# Patient Record
Sex: FEMALE | Race: WHITE | NOT HISPANIC OR LATINO | Employment: FULL TIME | URBAN - NONMETROPOLITAN AREA
[De-identification: names, ages, dates, MRNs, and addresses within clinical notes are randomized per-mention and may not be internally consistent; named-entity substitution may affect disease eponyms.]

---

## 2017-02-02 ENCOUNTER — APPOINTMENT (OUTPATIENT)
Dept: LAB | Facility: HOSPITAL | Age: 22
End: 2017-02-02

## 2017-02-02 ENCOUNTER — OFFICE VISIT (OUTPATIENT)
Dept: OBSTETRICS AND GYNECOLOGY | Facility: CLINIC | Age: 22
End: 2017-02-02

## 2017-02-02 VITALS
HEIGHT: 61 IN | DIASTOLIC BLOOD PRESSURE: 70 MMHG | SYSTOLIC BLOOD PRESSURE: 102 MMHG | BODY MASS INDEX: 32.66 KG/M2 | WEIGHT: 173 LBS

## 2017-02-02 DIAGNOSIS — Z11.3 SCREENING FOR STDS (SEXUALLY TRANSMITTED DISEASES): ICD-10-CM

## 2017-02-02 DIAGNOSIS — Z01.419 WELL WOMAN EXAM WITH ROUTINE GYNECOLOGICAL EXAM: Primary | ICD-10-CM

## 2017-02-02 LAB
CANDIDA ALBICANS: NEGATIVE
GARDNERELLA VAGINALIS: NEGATIVE
TRICHOMONAS VAGINALIS PCR: NEGATIVE

## 2017-02-02 PROCEDURE — 86592 SYPHILIS TEST NON-TREP QUAL: CPT | Performed by: NURSE PRACTITIONER

## 2017-02-02 PROCEDURE — G0432 EIA HIV-1/HIV-2 SCREEN: HCPCS | Performed by: NURSE PRACTITIONER

## 2017-02-02 PROCEDURE — 86803 HEPATITIS C AB TEST: CPT | Performed by: NURSE PRACTITIONER

## 2017-02-02 PROCEDURE — 87800 DETECT AGNT MULT DNA DIREC: CPT | Performed by: NURSE PRACTITIONER

## 2017-02-02 PROCEDURE — 36415 COLL VENOUS BLD VENIPUNCTURE: CPT | Performed by: NURSE PRACTITIONER

## 2017-02-02 PROCEDURE — 86695 HERPES SIMPLEX TYPE 1 TEST: CPT | Performed by: NURSE PRACTITIONER

## 2017-02-02 PROCEDURE — 87661 TRICHOMONAS VAGINALIS AMPLIF: CPT | Performed by: NURSE PRACTITIONER

## 2017-02-02 PROCEDURE — 87340 HEPATITIS B SURFACE AG IA: CPT | Performed by: NURSE PRACTITIONER

## 2017-02-02 PROCEDURE — 99395 PREV VISIT EST AGE 18-39: CPT | Performed by: NURSE PRACTITIONER

## 2017-02-02 PROCEDURE — 86696 HERPES SIMPLEX TYPE 2 TEST: CPT | Performed by: NURSE PRACTITIONER

## 2017-02-02 PROCEDURE — 87512 GARDNER VAG DNA QUANT: CPT | Performed by: NURSE PRACTITIONER

## 2017-02-02 PROCEDURE — 88142 CYTOPATH C/V THIN LAYER: CPT | Performed by: NURSE PRACTITIONER

## 2017-02-02 PROCEDURE — 87481 CANDIDA DNA AMP PROBE: CPT | Performed by: NURSE PRACTITIONER

## 2017-02-02 NOTE — PROGRESS NOTES
"Subjective   History of Present Illness    Avis Ziegler is a 21 y.o. female who presents for annual exam and STI testing. Current partner has been unfaithful. She has been experiencing vaginal discharge with odor for a few days. Denies genital lesions, vaginal irritation, or pruritus.     Current contraception: Mirena IUD  Sexually active?: Yes  Postmenopausal?: No  HRT?: no  Hysterectomy?: no    Date last pap: none on file  History of abnormal Pap smear: no    Visit Vitals   • /70   • Ht 61\" (154.9 cm)   • Wt 173 lb (78.5 kg)   • LMP 2017 (Exact Date)   • Breastfeeding No   • BMI 32.69 kg/m2       Past Medical History   Diagnosis Date   • Acute sinusitis    • Conjunctivitis       BILATERAL     • Encounter for insertion of intrauterine contraceptive device    • Encounter for  visit       visit status    • Encounter for surveillance of other contraceptives    • IUD check up    • Pain in pelvis    • Primigravida    • Upper respiratory infection    • Urinary tract infectious disease    • Vaginal discharge      Fishy vaginal discharge         Past Surgical History   Procedure Laterality Date   •  section  2014     Primary low transverse  section. Intrauterine pregnancy at 40 5/7 weeks gestation. Induction of labor secondary to postdates. Patient requests  section   • Injection of medication  2016     Marta (1)  Elizabeth Palacios       The following portions of the patient's history were reviewed and updated as appropriate: allergies, current medications, past family history, past medical history, past social history, past surgical history and problem list.    Review of Systems    Constitutional:  No fatigue, no weight loss, no weight gain, no fever, no chills   Respiratory: No dyspnea, no cough, no hemoptysis, no wheezing, no pleuritic pain   Cardiovascular: No chest pain, no palpitations, no arrhythmia, no orthopnea, no nocturnal dyspnea, no " edema, no claudication   Breasts: No discharge from nipple, No breast tenderness and No breast mass   Gastrointestinal: No loss of appetite, No dysphagia, No abdominal pain, No nausea, No vomiting, No change in bowel habits, No diarrhea, No constipation and No blood in stool   Genitourinary: No increased frequency of urination, No dysuria, No hematuria, No nocturia, No urinary incontinence, No abnormal vaginal bleeding, No pelvic pain, No menstrual problem, No menopausal problem and Vaginal discharge   Skin: No skin rash, No skin lesion, No dry skin, No pruritus and No nail problem   Neurologic: No headache, No dizziness, No lightheadedness, No syncope, No vertigo, No weakness, No numbness, No tremor and No paresthesia   Psychiatric: No difficulty sleeping, No mood swings, No feeling anxious, No confusion and No memory loss          Objective   Physical Exam    General:  Alert and oriented x 3, Cooperative, Well developed & well nourished and No acute distress   Abdomen: Soft, nondistended, non-tender, without masses or organomegaly   Breast: Inspection negative, no nipple discharge or bleeding, no masses or nodularity palpable and Symmetrical breasts in shape, size, consistency   Genitourinary: Vulva normal, vaginal mucosa normal, bladder nondistended and nontender, cervix normal, uterus normal size and nontender, no adnexal/pelvic tenderness or masses, Bartholin's/[place]/Urethral gland WNL, Vaginal discharge   Skin: Skin warm and dry and Pattern of hair growth normal       Assessment/Plan   Avis was seen today for personal problem.    Diagnoses and all orders for this visit:    Well woman exam with routine gynecological exam  -     Liquid-based Pap Smear, Screening    Screening for STDs (sexually transmitted diseases)  -     C Trachomatis / N Gonorrhoeae PCR  -     Hepatitis B Surface Antigen  -     Hepatitis C Antibody  -     HIV-1 & HIV-2 Antibodies  -     HSV 1 & 2 - Specific Antibody, IgG  -     RPR  -      Vaginitis / Vaginosis DNA Probe      All questions answered.  Labs today.  Will rule out vaginal infections.  Discussed sexual activity and couseled on sexual health. Recommended condoms for prevention of sexually transmitted diseases.  Follow up in 1 year.

## 2017-02-03 LAB
C TRACH RRNA CVX QL NAA+PROBE: NOT DETECTED
HBV SURFACE AG SERPL QL IA: NEGATIVE
HCV AB SER DONR QL: NEGATIVE
HCV S/C RATIO: 0.06 (ref 0–0.89)
HIV1+2 AB SER QL: NEGATIVE
HSV1 IGG SER IA-ACNC: 45.4 INDEX (ref 0–0.9)
HSV2 IGG SER IA-ACNC: <0.91 INDEX (ref 0–0.9)
N GONORRHOEA RRNA SPEC QL NAA+PROBE: NOT DETECTED
RPR SER QL: NORMAL

## 2017-02-06 RX ORDER — VALACYCLOVIR HYDROCHLORIDE 500 MG/1
500 TABLET, FILM COATED ORAL DAILY
Qty: 30 TABLET | Refills: 11 | Status: SHIPPED | OUTPATIENT
Start: 2017-02-06 | End: 2019-03-08

## 2017-02-08 LAB
LAB AP CASE REPORT: NORMAL
LAB AP GYN ADDITIONAL INFORMATION: NORMAL
LAB AP GYN OTHER FINDINGS: NORMAL
Lab: NORMAL
PATH INTERP SPEC-IMP: NORMAL
STAT OF ADQ CVX/VAG CYTO-IMP: NORMAL

## 2018-09-05 ENCOUNTER — HOSPITAL ENCOUNTER (OUTPATIENT)
Dept: ULTRASOUND IMAGING | Facility: HOSPITAL | Age: 23
Discharge: HOME | End: 2018-09-05
Admitting: NURSE PRACTITIONER

## 2018-09-05 DIAGNOSIS — R10.32 LEFT LOWER QUADRANT PAIN: ICD-10-CM

## 2018-09-05 PROCEDURE — 76830 TRANSVAGINAL US NON-OB: CPT

## 2019-03-08 PROCEDURE — 87660 TRICHOMONAS VAGIN DIR PROBE: CPT | Performed by: NURSE PRACTITIONER

## 2019-03-08 PROCEDURE — 87510 GARDNER VAG DNA DIR PROBE: CPT | Performed by: NURSE PRACTITIONER

## 2019-03-08 PROCEDURE — 87480 CANDIDA DNA DIR PROBE: CPT | Performed by: NURSE PRACTITIONER

## 2019-03-11 ENCOUNTER — PROCEDURE VISIT (OUTPATIENT)
Dept: OBSTETRICS AND GYNECOLOGY | Facility: CLINIC | Age: 24
End: 2019-03-11

## 2019-03-11 ENCOUNTER — APPOINTMENT (OUTPATIENT)
Dept: LAB | Facility: HOSPITAL | Age: 24
End: 2019-03-11

## 2019-03-11 VITALS
WEIGHT: 154 LBS | DIASTOLIC BLOOD PRESSURE: 90 MMHG | BODY MASS INDEX: 29.07 KG/M2 | HEIGHT: 61 IN | SYSTOLIC BLOOD PRESSURE: 134 MMHG

## 2019-03-11 DIAGNOSIS — Z12.4 ENCOUNTER FOR PAPANICOLAOU SMEAR OF CERVIX: Primary | ICD-10-CM

## 2019-03-11 DIAGNOSIS — N76.0 BV (BACTERIAL VAGINOSIS): ICD-10-CM

## 2019-03-11 DIAGNOSIS — B96.89 BV (BACTERIAL VAGINOSIS): ICD-10-CM

## 2019-03-11 DIAGNOSIS — Z01.419 ENCOUNTER FOR WELL WOMAN EXAM WITH ROUTINE GYNECOLOGICAL EXAM: ICD-10-CM

## 2019-03-11 DIAGNOSIS — N86 ECTROPION, CERVIX: ICD-10-CM

## 2019-03-11 DIAGNOSIS — Z30.431 ENCOUNTER FOR ROUTINE CHECKING OF INTRAUTERINE CONTRACEPTIVE DEVICE (IUD): ICD-10-CM

## 2019-03-11 LAB
25(OH)D3 SERPL-MCNC: 39.9 NG/ML (ref 30–100)
ALBUMIN SERPL-MCNC: 4.4 G/DL (ref 3.4–4.8)
ALBUMIN/GLOB SERPL: 1.5 G/DL (ref 1.1–1.8)
ALP SERPL-CCNC: 59 U/L (ref 38–126)
ALT SERPL W P-5'-P-CCNC: 26 U/L (ref 9–52)
ANION GAP SERPL CALCULATED.3IONS-SCNC: 10 MMOL/L (ref 5–15)
AST SERPL-CCNC: 24 U/L (ref 14–36)
BASOPHILS # BLD AUTO: 0.03 10*3/MM3 (ref 0–0.2)
BASOPHILS NFR BLD AUTO: 0.3 % (ref 0–1.5)
BILIRUB SERPL-MCNC: 0.3 MG/DL (ref 0.2–1.3)
BUN BLD-MCNC: 10 MG/DL (ref 7–21)
BUN/CREAT SERPL: 15.9 (ref 7–25)
CALCIUM SPEC-SCNC: 9.3 MG/DL (ref 8.4–10.2)
CHLORIDE SERPL-SCNC: 103 MMOL/L (ref 95–110)
CO2 SERPL-SCNC: 25 MMOL/L (ref 22–31)
CREAT BLD-MCNC: 0.63 MG/DL (ref 0.5–1)
DEPRECATED RDW RBC AUTO: 35.5 FL (ref 37–54)
EOSINOPHIL # BLD AUTO: 0.06 10*3/MM3 (ref 0–0.4)
EOSINOPHIL NFR BLD AUTO: 0.7 % (ref 0.3–6.2)
ERYTHROCYTE [DISTWIDTH] IN BLOOD BY AUTOMATED COUNT: 11.6 % (ref 12.3–15.4)
GFR SERPL CREATININE-BSD FRML MDRD: 116 ML/MIN/1.73 (ref 71–165)
GLOBULIN UR ELPH-MCNC: 3 GM/DL (ref 2.3–3.5)
GLUCOSE BLD-MCNC: 83 MG/DL (ref 60–100)
HCT VFR BLD AUTO: 41.6 % (ref 34–46.6)
HGB BLD-MCNC: 14.5 G/DL (ref 12–15.9)
IMM GRANULOCYTES # BLD AUTO: 0.03 10*3/MM3 (ref 0–0.05)
IMM GRANULOCYTES NFR BLD AUTO: 0.3 % (ref 0–0.5)
LYMPHOCYTES # BLD AUTO: 2.56 10*3/MM3 (ref 0.7–3.1)
LYMPHOCYTES NFR BLD AUTO: 28.4 % (ref 19.6–45.3)
MCH RBC QN AUTO: 29.8 PG (ref 26.6–33)
MCHC RBC AUTO-ENTMCNC: 34.9 G/DL (ref 31.5–35.7)
MCV RBC AUTO: 85.4 FL (ref 79–97)
MONOCYTES # BLD AUTO: 0.57 10*3/MM3 (ref 0.1–0.9)
MONOCYTES NFR BLD AUTO: 6.3 % (ref 5–12)
NEUTROPHILS # BLD AUTO: 5.75 10*3/MM3 (ref 1.4–7)
NEUTROPHILS NFR BLD AUTO: 64 % (ref 42.7–76)
NRBC BLD AUTO-RTO: 0 /100 WBC (ref 0–0)
PLATELET # BLD AUTO: 214 10*3/MM3 (ref 140–450)
PMV BLD AUTO: 9.5 FL (ref 6–12)
POTASSIUM BLD-SCNC: 3.9 MMOL/L (ref 3.5–5.1)
PROT SERPL-MCNC: 7.4 G/DL (ref 6.3–8.6)
RBC # BLD AUTO: 4.87 10*6/MM3 (ref 3.77–5.28)
SODIUM BLD-SCNC: 138 MMOL/L (ref 137–145)
WBC NRBC COR # BLD: 9 10*3/MM3 (ref 3.4–10.8)

## 2019-03-11 PROCEDURE — 87624 HPV HI-RISK TYP POOLED RSLT: CPT | Performed by: NURSE PRACTITIONER

## 2019-03-11 PROCEDURE — 36415 COLL VENOUS BLD VENIPUNCTURE: CPT | Performed by: NURSE PRACTITIONER

## 2019-03-11 PROCEDURE — 88141 CYTOPATH C/V INTERPRET: CPT | Performed by: PATHOLOGY

## 2019-03-11 PROCEDURE — 84443 ASSAY THYROID STIM HORMONE: CPT | Performed by: NURSE PRACTITIONER

## 2019-03-11 PROCEDURE — 88142 CYTOPATH C/V THIN LAYER: CPT | Performed by: NURSE PRACTITIONER

## 2019-03-11 PROCEDURE — 85025 COMPLETE CBC W/AUTO DIFF WBC: CPT | Performed by: NURSE PRACTITIONER

## 2019-03-11 PROCEDURE — 80053 COMPREHEN METABOLIC PANEL: CPT | Performed by: NURSE PRACTITIONER

## 2019-03-11 PROCEDURE — 99395 PREV VISIT EST AGE 18-39: CPT | Performed by: NURSE PRACTITIONER

## 2019-03-11 PROCEDURE — 82306 VITAMIN D 25 HYDROXY: CPT | Performed by: NURSE PRACTITIONER

## 2019-03-11 NOTE — PROGRESS NOTES
Subjective   Avis Ziegler is a 23 y.o.     Pt presents for annual exam.  She will also be due for IUD removal next month and desires to have replaced with another mirena.  Avis has normal monthly menses still with IUD but not painful, LMP last week.  She was dx with BV yesterday at urgent care and is currently taking meds.  She denies any other complaints         The following portions of the patient's history were reviewed and updated as appropriate: allergies, current medications, past family history, past medical history, past social history, past surgical history and problem list.    Review of Systems   Gastrointestinal: Negative for abdominal pain.   Genitourinary: Positive for vaginal discharge. Negative for dysuria and pelvic pain.   All other systems reviewed and are negative.        Objective   Physical Exam   Constitutional: She is oriented to person, place, and time. She appears well-developed and well-nourished.   HENT:   Head: Normocephalic.   Eyes: EOM are normal. Pupils are equal, round, and reactive to light.   Neck: Normal range of motion.   Cardiovascular: Normal rate, regular rhythm and normal heart sounds.   Pulmonary/Chest: Effort normal and breath sounds normal. Right breast exhibits no mass. Left breast exhibits no mass. Breasts are symmetrical.   Abdominal: Soft.   Genitourinary: No breast tenderness. Pelvic exam was performed with patient supine. Cervix exhibits friability. Vaginal discharge found.       Genitourinary Comments: Friability with nabothian cyst     Musculoskeletal: Normal range of motion.   Neurological: She is alert and oriented to person, place, and time.   Skin: Skin is warm.   Psychiatric: Her behavior is normal. Judgment and thought content normal.   Nursing note and vitals reviewed.        Assessment/Plan   Avis was seen today for gynecologic exam.    Diagnoses and all orders for this visit:    Encounter for Papanicolaou smear of cervix  -     Liquid-based  Pap Smear, Screening    Encounter for well woman exam with routine gynecological exam  -     CBC & Differential  -     Comprehensive Metabolic Panel  -     Vitamin D 25 Hydroxy  -     Lipid Panel  -     TSH Rfx On Abnormal To Free T4    Encounter for routine checking of intrauterine contraceptive device (IUD)    BV (bacterial vaginosis)    Ectropion, cervix    Plan: Annual visit with pap and annual labs today, IUD strings visualized.  Will return for IUD out/in in next 2-3wks.

## 2019-03-12 LAB
GEN CATEG CVX/VAG CYTO-IMP: ABNORMAL
LAB AP CASE REPORT: ABNORMAL
LAB AP GYN ADDITIONAL INFORMATION: ABNORMAL
PATH INTERP SPEC-IMP: ABNORMAL
STAT OF ADQ CVX/VAG CYTO-IMP: ABNORMAL
TSH SERPL-ACNC: 1.09 UIU/ML (ref 0.45–4.5)

## 2019-03-14 LAB — HPV I/H RISK 4 DNA CVX QL PROBE+SIG AMP: NEGATIVE

## 2019-04-08 ENCOUNTER — PROCEDURE VISIT (OUTPATIENT)
Dept: OBSTETRICS AND GYNECOLOGY | Facility: CLINIC | Age: 24
End: 2019-04-08

## 2019-04-08 VITALS
HEIGHT: 61 IN | DIASTOLIC BLOOD PRESSURE: 77 MMHG | SYSTOLIC BLOOD PRESSURE: 125 MMHG | WEIGHT: 146 LBS | BODY MASS INDEX: 27.56 KG/M2

## 2019-04-08 DIAGNOSIS — Z30.430 ENCOUNTER FOR INSERTION OF MIRENA IUD: Primary | ICD-10-CM

## 2019-04-08 DIAGNOSIS — Z30.432 ENCOUNTER FOR IUD REMOVAL: ICD-10-CM

## 2019-04-08 PROCEDURE — 58300 INSERT INTRAUTERINE DEVICE: CPT | Performed by: ADVANCED PRACTICE MIDWIFE

## 2019-04-08 PROCEDURE — 58301 REMOVE INTRAUTERINE DEVICE: CPT | Performed by: ADVANCED PRACTICE MIDWIFE

## 2019-04-08 NOTE — PROGRESS NOTES
IUD Removal    Date of procedure:    4/8/2019  Preamble:    Risks and benefits discussed? yes  All questions answered? yes  Consents given by The patient  Written consent obtained? yes  Reason for removal: Device expiration    Local anesthesia used:  yes - cetaine spray    Procedure documentation:    A speculum was placed in order to view the cervix.  The cervix did need to be grasped.  Cervical dilation did not need to be performed in order to access the string.  The IUD string was easily seen.  The string was grasped and the IUD was removed without difficulty.  The IUD did appear to be adherent to the uterine cavity. It was removed intact.    She tolerated the procedure without any difficulty.     Post procedure instructions: Patient notified to call with heavy bleeding, fever or increasing pain.     :  Problems Addressed this Visit     None      Visit Diagnoses     Encounter for insertion of mirena IUD    -  Primary    Encounter for IUD removal              Follow up needed: As needed      IUD Insertion    No LMP recorded. Patient has had an implant.    Date of procedure:    4/8/2019  Preamble:    Risks and benefits discussed? yes  All questions answered? yes  Consents given by The patient  Written consent obtained? yes    Local anesthesia used:  yes - citacaine spray   NDC number:    Procedure documentation:    After verifying the patient had a low probability of being pregnant and met the criteria for insertion, a speculum was placed and the cervix was cleansed with an antiseptic solution.  The anterior lip of the cervix was grasped and the uterine cavity was gently sounded. There was no difficulty passing the sound through the cervix.  Cervical dilation did not need to be performed prior to placing the IUD.  The uterus was retroverted and sounded to 7 cms.  The Mirena was then prepared per the manufacturers instructions.    The Mirena was advanced to a point 2 cms from the fundus and then the arms were  released from the sheath.  The device was advanced to the fundus and the device was released fully from the sheath.. The string was cut 2 cms in length.  Bleeding from the cervix was scant.    She tolerated the procedure without any difficulty.    NDC 14826-090-41    Post procedure instructions: Call if any fever or excessive bleeding or pain.                                                       Nothing in the vagina for the next week.     :  Problems Addressed this Visit     None      Visit Diagnoses     Encounter for insertion of mirena IUD    -  Primary    Encounter for IUD removal              Follow up needed:  Three weeks for a string check, sooner if she has any problems.      This note was electronically signed.      This document has been electronically signed by SAÚL Parr on April 8, 2019 8:39 AM      This document has been electronically signed by SAÚL Parr on April 8, 2019 8:37 AM        This document has been electronically signed by SAÚL Parr on April 8, 2019 8:34 AM

## 2019-04-30 ENCOUNTER — OFFICE VISIT (OUTPATIENT)
Dept: OBSTETRICS AND GYNECOLOGY | Facility: CLINIC | Age: 24
End: 2019-04-30

## 2019-04-30 VITALS
DIASTOLIC BLOOD PRESSURE: 80 MMHG | SYSTOLIC BLOOD PRESSURE: 120 MMHG | BODY MASS INDEX: 26.62 KG/M2 | HEIGHT: 61 IN | WEIGHT: 141 LBS

## 2019-04-30 DIAGNOSIS — Z30.431 IUD CHECK UP: Primary | ICD-10-CM

## 2019-04-30 PROCEDURE — 99212 OFFICE O/P EST SF 10 MIN: CPT | Performed by: ADVANCED PRACTICE MIDWIFE

## 2019-04-30 NOTE — PROGRESS NOTES
"Chief Complaint   Patient presents with   • string check     Avis Ziegler is a 23 y.o. year old No obstetric history on file. presenting for follow-up of her recently placed Mirena.  Since the time of insertion flow is typically normal.  She has not been sexually active.  Avis has not been sexually active. Her partner  one year ago & she has not been sexually active. She did not disclose this information at last visit because it was the anniversary of hos death & could not discuss it      /80   Ht 153.7 cm (60.5\")   Wt 64 kg (141 lb)   BMI 27.08 kg/m²     Physical Exam:    Clinical staff was present for exam  External genitalia:  normal appearance of the external genitalia including Bartholin's and [place]'s glands.  :  urethral meatus normal;  Vaginal:  normal pink mucosa without prolapse or lesions.  Cervix:  normal appearance and IUD string present  Uterus:  normal size, shape and consistency.  Adnexa:  normal bimanual exam of the adnexa.    Review of Systems   Constitutional: Negative.    HENT: Negative.    Eyes: Negative.    Respiratory: Negative.    Cardiovascular: Negative.    Gastrointestinal: Negative.    Endocrine: Negative.    Genitourinary: Negative.    Musculoskeletal: Negative.    Skin: Negative.    Allergic/Immunologic: Negative.    Neurological: Negative.    Hematological: Negative.    Psychiatric/Behavioral: Negative.           :  Problems Addressed this Visit     None      Visit Diagnoses     IUD check up    -  Primary          Plan   1. Normal IUD check up  2. RTC in 1 year or PRN    No orders of the defined types were placed in this encounter.               This document has been electronically signed by SAÚL Parr on 2019 9:33 AM      "

## 2019-09-23 ENCOUNTER — APPOINTMENT (OUTPATIENT)
Dept: LAB | Facility: HOSPITAL | Age: 24
End: 2019-09-23

## 2019-09-23 ENCOUNTER — OFFICE VISIT (OUTPATIENT)
Dept: OBSTETRICS AND GYNECOLOGY | Facility: CLINIC | Age: 24
End: 2019-09-23

## 2019-09-23 VITALS
SYSTOLIC BLOOD PRESSURE: 122 MMHG | DIASTOLIC BLOOD PRESSURE: 77 MMHG | WEIGHT: 146 LBS | BODY MASS INDEX: 28.66 KG/M2 | HEIGHT: 60 IN

## 2019-09-23 DIAGNOSIS — Z11.3 SCREENING EXAMINATION FOR STD (SEXUALLY TRANSMITTED DISEASE): Primary | ICD-10-CM

## 2019-09-23 DIAGNOSIS — T83.32XA INTRAUTERINE CONTRACEPTIVE DEVICE THREADS LOST, INITIAL ENCOUNTER: ICD-10-CM

## 2019-09-23 LAB
B-HCG UR QL: NEGATIVE
BILIRUB UR QL STRIP: NEGATIVE
CANDIDA ALBICANS: NEGATIVE
CLARITY UR: CLEAR
COLOR UR: YELLOW
GARDNERELLA VAGINALIS: NEGATIVE
GLUCOSE UR STRIP-MCNC: NEGATIVE MG/DL
HBV SURFACE AG SERPL QL IA: NORMAL
HCV AB SER DONR QL: NORMAL
HGB UR QL STRIP.AUTO: NEGATIVE
HIV1+2 AB SER QL: NORMAL
INTERNAL NEGATIVE CONTROL: NEGATIVE
INTERNAL POSITIVE CONTROL: POSITIVE
KETONES UR QL STRIP: NEGATIVE
LEUKOCYTE ESTERASE UR QL STRIP.AUTO: NEGATIVE
Lab: NORMAL
NITRITE UR QL STRIP: NEGATIVE
PH UR STRIP.AUTO: 6 [PH] (ref 5–8)
PROT UR QL STRIP: NEGATIVE
RPR SER QL: NORMAL
SP GR UR STRIP: 1.01 (ref 1–1.03)
T VAGINALIS DNA VAG QL PROBE+SIG AMP: NEGATIVE
UROBILINOGEN UR QL STRIP: NORMAL

## 2019-09-23 PROCEDURE — 99213 OFFICE O/P EST LOW 20 MIN: CPT | Performed by: FAMILY MEDICINE

## 2019-09-23 PROCEDURE — 86592 SYPHILIS TEST NON-TREP QUAL: CPT | Performed by: FAMILY MEDICINE

## 2019-09-23 PROCEDURE — 36415 COLL VENOUS BLD VENIPUNCTURE: CPT | Performed by: FAMILY MEDICINE

## 2019-09-23 PROCEDURE — 87591 N.GONORRHOEAE DNA AMP PROB: CPT | Performed by: FAMILY MEDICINE

## 2019-09-23 PROCEDURE — 87340 HEPATITIS B SURFACE AG IA: CPT | Performed by: FAMILY MEDICINE

## 2019-09-23 PROCEDURE — 86696 HERPES SIMPLEX TYPE 2 TEST: CPT | Performed by: FAMILY MEDICINE

## 2019-09-23 PROCEDURE — 87510 GARDNER VAG DNA DIR PROBE: CPT | Performed by: FAMILY MEDICINE

## 2019-09-23 PROCEDURE — 81003 URINALYSIS AUTO W/O SCOPE: CPT | Performed by: FAMILY MEDICINE

## 2019-09-23 PROCEDURE — G0432 EIA HIV-1/HIV-2 SCREEN: HCPCS | Performed by: FAMILY MEDICINE

## 2019-09-23 PROCEDURE — 86695 HERPES SIMPLEX TYPE 1 TEST: CPT | Performed by: FAMILY MEDICINE

## 2019-09-23 PROCEDURE — 86694 HERPES SIMPLEX NES ANTBDY: CPT | Performed by: FAMILY MEDICINE

## 2019-09-23 PROCEDURE — 87480 CANDIDA DNA DIR PROBE: CPT | Performed by: FAMILY MEDICINE

## 2019-09-23 PROCEDURE — 87661 TRICHOMONAS VAGINALIS AMPLIF: CPT | Performed by: FAMILY MEDICINE

## 2019-09-23 PROCEDURE — 87660 TRICHOMONAS VAGIN DIR PROBE: CPT | Performed by: FAMILY MEDICINE

## 2019-09-23 PROCEDURE — 81025 URINE PREGNANCY TEST: CPT | Performed by: FAMILY MEDICINE

## 2019-09-23 PROCEDURE — 87491 CHLMYD TRACH DNA AMP PROBE: CPT | Performed by: FAMILY MEDICINE

## 2019-09-23 PROCEDURE — 86803 HEPATITIS C AB TEST: CPT | Performed by: FAMILY MEDICINE

## 2019-09-23 NOTE — PROGRESS NOTES
[facility]  Gynecology Visit    CC:   Chief Complaint   Patient presents with   • Gynecologic Exam     HPI  Avis Ziegler is a 24 y.o.  premenopausal female who presents for STD testing.  Patient has a new sexual partner and uses Mirena for birth control, she has noticed increased vaginal discharge and burning on urination for the past 2 weeks. Unsure of LMP as she has not had regular periods on Mirena.    GYN HISTORY  Menses: Regular, every 30 days, lasts 3-4 days, spotting, no intermenstrual bleeding  History of STIs: denies  Last pap smear:   Last Completed Pap Smear       Status Date      PAP SMEAR Done 3/11/2019 LIQUID-BASED PAP SMEAR, SCREENING     Patient has more history with this topic...        Abnormal pap smear history: ASCUS, HPV neg on last pap  Contraception: Mirena     OB HISTORY  OB History    Para Term  AB Living   1 1 1     1   SAB TAB Ectopic Molar Multiple Live Births             1      # Outcome Date GA Lbr Lacho/2nd Weight Sex Delivery Anes PTL Lv   1 Term         JERRY        PAST MEDICAL HISTORY  Past Medical History:   Diagnosis Date   • Conjunctivitis      BILATERAL     • Encounter for insertion of intrauterine contraceptive device    • Encounter for  visit      visit status    • IUD check up    • Pain in pelvis    • Urinary tract infectious disease    • Vaginal discharge     Fishy vaginal discharge       PAST SURGICAL HISTORY  Past Surgical History:   Procedure Laterality Date   •  SECTION  2014    Primary low transverse  section. Intrauterine pregnancy at 40 5/7 weeks gestation. Induction of labor secondary to postdates. Patient requests  section   • INJECTION OF MEDICATION  2016    Marta (1)  Elizabeth Palacios     FAMILY HISTORY  Family History   Problem Relation Age of Onset   • Anxiety disorder Mother    • Kidney nephrosis Mother    • Heart disease Father         Leaky heart valve   •  "Hyperlipidemia Father         Hypercholesterolemia   • Sleep disorder Father    • Breast cancer Other    • Depression Other    • Hypertension Other      SOCIAL HISTORY  Social History     Socioeconomic History   • Marital status:      Spouse name: Not on file   • Number of children: Not on file   • Years of education: Not on file   • Highest education level: Not on file   Tobacco Use   • Smoking status: Never Smoker   • Smokeless tobacco: Never Used   Substance and Sexual Activity   • Alcohol use: No   • Drug use: No   • Sexual activity: Defer     ALLERGIES  Allergies   Allergen Reactions   • Biaxin [Clarithromycin] Rash   • Ceclor [Cefaclor] Rash   • Penicillins Rash     HOME MEDICATIONS  Prior to Admission medications    Medication Sig Start Date End Date Taking? Authorizing Provider   Levonorgestrel (MIRENA, 52 MG, IU) by Intrauterine route.   Yes Emergency, Nurse Rosalind RN   ondansetron ODT (ZOFRAN-ODT) 4 MG disintegrating tablet Take 4 mg by mouth Every 6 (Six) Hours As Needed. 9/4/19 9/23/19  Emergency, Nurse SYD Boyer     ROS  Review of Systems   Constitutional: Negative for chills and fever.   Eyes: Negative for photophobia and visual disturbance.   Respiratory: Negative for cough and shortness of breath.    Cardiovascular: Negative for chest pain, palpitations and leg swelling.   Gastrointestinal: Negative for nausea and vomiting.   Genitourinary: Positive for dysuria, frequency and vaginal discharge. Negative for decreased urine volume, flank pain, vaginal bleeding and vaginal pain.   Musculoskeletal: Negative for arthralgias and gait problem.   Skin: Negative for color change and rash.   Neurological: Negative for dizziness, light-headedness and headache.   Psychiatric/Behavioral: Negative for depressed mood. The patient is not nervous/anxious.    All other systems reviewed and are negative.        PE  /77   Ht 152.4 cm (60\")   Wt 66.2 kg (146 lb)   BMI 28.51 kg/m²        General: Alert, " healthy, no distress, well nourished and well developed.  Neurologic: Alert, oriented to person, place, and time.  Gait normal.  Cranial nerves II-XII grossly intact.  HEENT: Normocephalic, atraumatic.  Extraocular muscles intact, pupils equal and reactive times two.    Neck: Supple, no adenopathy, thyroid normal size, non-tender, without nodularity, trachea midline.  Breasts: deferred  Lungs: Normal respiratory effort.  Clear to auscultation bilaterally.  No wheezes, rhonci, or rales.  Heart: Regular rate and rhythm.  No murmer, rub or gallop.  Abdomen: Soft, non-tender, non-distended,no masses, no hepatosplenomegaly, no hernia.  Skin: No rash, no lesions.  Extremities: No cyanosis, clubbing or edema.  PELVIC EXAM:  External Genitalia/Vulva: Anatomy is normal, no significant redness of labia, no discharge on vulvar tissues, [place]'s and Bartholin's glands are normal, no ulcers, no condylomatous lesions.  Urethra: Normal, no lesions.  Vagina: Vaginal tissues are not inflamed, normal color and texture, no significant discharge present.  Cervix: Strawberry cervix with clear mucoid discharge noted, no purulent discharge, no cervical motion tenderness.  IUD strings not visible.  Uterus: Normal size, shape, and consistency.  Adnexa: Normal size and shape bilaterally, no palpable mass bilaterally and non-tender bilaterally.  Rectal: Normal, no masses or polyps, confirms bimanual exam, perianal normal, no lesions; NARINDER deferred.    IMPRESSION/PLAN    Avis Tera Ziegler is a 24 y.o.  here for:       1. Intrauterine contraceptive device threads lost, initial encounter  - US Non-ob Transvaginal; Future  - Hepatitis B Surface Antigen  - Hepatitis C Antibody  - HIV-1 & HIV-2 Antibodies  - HSV 1 & 2 - Specific Antibody, IgG  - HSV Non-Specific Antibody, IgM  - RPR  - Chlamydia trachomatis, Neisseria gonorrhoeae, Trichomonas vaginalis, PCR - Swab, Cervix  - Gardnerella vaginalis, Trichomonas vaginalis, Candida albicans,  DNA - Swab, Vagina  - Urinalysis With Culture If Indicated - Urine, Clean Catch  - POC Pregnancy, Urine  - Hepatitis B Surface Antigen  - HIV-1 / O / 2 Ag / Antibody 4th Generation    2. Screening examination for STD (sexually transmitted disease)  - Hepatitis B Surface Antigen  - Hepatitis C Antibody  - HIV-1 & HIV-2 Antibodies  - HSV 1 & 2 - Specific Antibody, IgG  - HSV Non-Specific Antibody, IgM  - RPR  - Chlamydia trachomatis, Neisseria gonorrhoeae, Trichomonas vaginalis, PCR - Swab, Cervix  - Gardnerella vaginalis, Trichomonas vaginalis, Candida albicans, DNA - Swab, Vagina  - Urinalysis With Culture If Indicated - Urine, Clean Catch  - Hepatitis B Surface Antigen  - HIV-1 / O / 2 Ag / Antibody 4th Generation           Patient will be called with results.  Follow up PRN pending test results.    Signature  Ginger Jacob MD  [facility]    This document has been electronically signed by Ginger Jacob MD on September 23, 2019 9:01 AM

## 2019-09-24 DIAGNOSIS — A56.2 ACUTE GENITOURINARY CHLAMYDIA TRACHOMATIS INFECTION: Primary | ICD-10-CM

## 2019-09-24 LAB
C TRACH RRNA CVX QL NAA+PROBE: POSITIVE
HSV1 IGG SER IA-ACNC: 48.8 INDEX (ref 0–0.9)
HSV1+2 IGM SER IA-ACNC: <0.91 RATIO (ref 0–0.9)
HSV2 IGG SER IA-ACNC: <0.91 INDEX (ref 0–0.9)
N GONORRHOEA RRNA SPEC QL NAA+PROBE: NEGATIVE
TRICHOMONAS VAGINALIS PCR: NEGATIVE

## 2019-09-24 RX ORDER — DOXYCYCLINE HYCLATE 100 MG/1
100 CAPSULE ORAL 2 TIMES DAILY
Qty: 14 CAPSULE | Refills: 0 | Status: SHIPPED | OUTPATIENT
Start: 2019-09-24 | End: 2019-10-01

## 2019-09-25 ENCOUNTER — TELEPHONE (OUTPATIENT)
Dept: OBSTETRICS AND GYNECOLOGY | Facility: CLINIC | Age: 24
End: 2019-09-25

## 2019-09-25 NOTE — TELEPHONE ENCOUNTER
----- Message from Ginger Jacob MD sent at 9/24/2019 12:42 PM CDT -----  Azithromycin 1g sent to pharmacy

## 2019-09-25 NOTE — TELEPHONE ENCOUNTER
----- Message from Ginger Jacob MD sent at 9/24/2019 12:47 PM CDT -----  Pt has macrolide allergy, Doxycycline 100mg BID x 7d sent to pharmacy

## 2019-09-26 ENCOUNTER — TELEPHONE (OUTPATIENT)
Dept: OBSTETRICS AND GYNECOLOGY | Facility: CLINIC | Age: 24
End: 2019-09-26

## 2019-10-07 ENCOUNTER — OFFICE VISIT (OUTPATIENT)
Dept: OBSTETRICS AND GYNECOLOGY | Facility: CLINIC | Age: 24
End: 2019-10-07

## 2019-10-07 VITALS — WEIGHT: 149 LBS | BODY MASS INDEX: 29.1 KG/M2 | DIASTOLIC BLOOD PRESSURE: 92 MMHG | SYSTOLIC BLOOD PRESSURE: 133 MMHG

## 2019-10-07 DIAGNOSIS — Z20.2 CHLAMYDIA CONTACT, TREATED: Primary | ICD-10-CM

## 2019-10-07 PROCEDURE — 99212 OFFICE O/P EST SF 10 MIN: CPT | Performed by: NURSE PRACTITIONER

## 2019-10-07 NOTE — PROGRESS NOTES
Subjective   Avis Ziegler is a 24 y.o. F/u chlamydia treatment    BC: Mirena  Pap: 3/11/19, ASCUS    Pt presents desires a MARIO for chlamydia as she wants to know when she and her partner can resume sex. Was prescribed doxycyline on 9/24. She has completed treatment and her partner has also treated as well. Reports symptoms of vaginal discharge and burning on urination has resolved.       Gynecologic Exam   The patient's pertinent negatives include no genital itching, genital lesions, genital odor, genital rash, missed menses, pelvic pain, vaginal bleeding or vaginal discharge. The patient is experiencing no pain. Pertinent negatives include no abdominal pain, chills, constipation, diarrhea, dysuria, fever, frequency, nausea, rash or sore throat. Nothing aggravates the symptoms. She has tried antibiotics for the symptoms. The treatment provided significant relief. She uses an IUD for contraception.       The following portions of the patient's history were reviewed and updated as appropriate: allergies, current medications, past family history, past medical history, past social history, past surgical history and problem list.    Review of Systems   Constitutional: Negative for chills, fatigue, fever, unexpected weight gain and unexpected weight loss.   HENT: Negative for sore throat.    Respiratory: Negative for shortness of breath.    Cardiovascular: Negative for chest pain and palpitations.   Gastrointestinal: Negative for abdominal pain, constipation, diarrhea and nausea.   Genitourinary: Negative for difficulty urinating, dysuria, frequency, menstrual problem, missed menses, pelvic pain, vaginal bleeding, vaginal discharge and vaginal pain.   Skin: Negative for rash.   Neurological: Negative for headache.   Psychiatric/Behavioral: Negative for sleep disturbance and stress.       Objective   Physical Exam   Constitutional: She is oriented to person, place, and time. She appears well-developed and  well-nourished.   HENT:   Head: Normocephalic.   Neck: Normal range of motion. Neck supple. No thyromegaly present.   Cardiovascular: Normal rate and regular rhythm.   Pulmonary/Chest: Effort normal and breath sounds normal.   Abdominal: Soft. Bowel sounds are normal.   Musculoskeletal: Normal range of motion.   Neurological: She is alert and oriented to person, place, and time.   Skin: Skin is warm and dry.   Psychiatric: She has a normal mood and affect. Her behavior is normal.   Nursing note and vitals reviewed.        Assessment/Plan   Avis was seen today for follow-up.    Diagnoses and all orders for this visit:    Chlamydia contact, treated      Reviewed plan of care with Dr. Jacob as this patient was preciously seen by her. Reviewed with patient that a test of cure for chlamydia if she and her partner have been treated and symptoms have resolved is not recommended. Additionally, it would be too early to do one today as it has only been two weeks and can result in a false positive. Pt may return in 3 months for screening for reinfection or return sooner if symptoms return. She can resume sexual intercourse.  Pt to also return March 2020 for F/U abnormal pap-ASCUS.

## 2020-02-06 ENCOUNTER — OFFICE VISIT (OUTPATIENT)
Dept: OBSTETRICS AND GYNECOLOGY | Facility: CLINIC | Age: 25
End: 2020-02-06

## 2020-02-06 ENCOUNTER — APPOINTMENT (OUTPATIENT)
Dept: LAB | Facility: HOSPITAL | Age: 25
End: 2020-02-06

## 2020-02-06 VITALS
BODY MASS INDEX: 34.55 KG/M2 | HEIGHT: 60 IN | DIASTOLIC BLOOD PRESSURE: 70 MMHG | WEIGHT: 176 LBS | SYSTOLIC BLOOD PRESSURE: 112 MMHG

## 2020-02-06 DIAGNOSIS — R30.0 DYSURIA: ICD-10-CM

## 2020-02-06 DIAGNOSIS — N89.8 VAGINAL IRRITATION: ICD-10-CM

## 2020-02-06 DIAGNOSIS — Z11.3 SCREENING EXAMINATION FOR STD (SEXUALLY TRANSMITTED DISEASE): Primary | ICD-10-CM

## 2020-02-06 DIAGNOSIS — Z86.19 HISTORY OF CHLAMYDIA: ICD-10-CM

## 2020-02-06 LAB
BILIRUB UR QL STRIP: NEGATIVE
CANDIDA ALBICANS: NEGATIVE
CLARITY UR: CLEAR
COLOR UR: YELLOW
GARDNERELLA VAGINALIS: NEGATIVE
GLUCOSE UR STRIP-MCNC: NEGATIVE MG/DL
HGB UR QL STRIP.AUTO: NEGATIVE
KETONES UR QL STRIP: NEGATIVE
LEUKOCYTE ESTERASE UR QL STRIP.AUTO: NEGATIVE
NITRITE UR QL STRIP: NEGATIVE
PH UR STRIP.AUTO: 6.5 [PH] (ref 5–8)
PROT UR QL STRIP: NEGATIVE
SP GR UR STRIP: 1.01 (ref 1–1.03)
T VAGINALIS DNA VAG QL PROBE+SIG AMP: NEGATIVE
UROBILINOGEN UR QL STRIP: NORMAL

## 2020-02-06 PROCEDURE — 87661 TRICHOMONAS VAGINALIS AMPLIF: CPT | Performed by: NURSE PRACTITIONER

## 2020-02-06 PROCEDURE — 87491 CHLMYD TRACH DNA AMP PROBE: CPT | Performed by: NURSE PRACTITIONER

## 2020-02-06 PROCEDURE — 87660 TRICHOMONAS VAGIN DIR PROBE: CPT | Performed by: NURSE PRACTITIONER

## 2020-02-06 PROCEDURE — 87591 N.GONORRHOEAE DNA AMP PROB: CPT | Performed by: NURSE PRACTITIONER

## 2020-02-06 PROCEDURE — 87086 URINE CULTURE/COLONY COUNT: CPT | Performed by: NURSE PRACTITIONER

## 2020-02-06 PROCEDURE — 87510 GARDNER VAG DNA DIR PROBE: CPT | Performed by: NURSE PRACTITIONER

## 2020-02-06 PROCEDURE — 99212 OFFICE O/P EST SF 10 MIN: CPT | Performed by: NURSE PRACTITIONER

## 2020-02-06 PROCEDURE — 81003 URINALYSIS AUTO W/O SCOPE: CPT | Performed by: NURSE PRACTITIONER

## 2020-02-06 PROCEDURE — 87480 CANDIDA DNA DIR PROBE: CPT | Performed by: NURSE PRACTITIONER

## 2020-02-06 NOTE — PROGRESS NOTES
"Subjective   Avis Ziegler is a 24 y.o. here desires to rescreen for Chlamydia    Pt desires to be re-screened for chlamydia as she and her partner did not wait to have sex as instructed after completing their antibiotics. Pt also reports having vulvar itching and burning with voiding for a \"couple of weeks.\" Denies vaginal discharge or odor.   Exposure to STD    The patient's primary symptoms include dysuria and genital itching. The patient's pertinent negatives include no discharge, dyspareunia, genital rash or pelvic pain. This is a new problem. The current episode started 1 to 4 weeks ago. The problem has been unchanged. The vaginal discharge was normal. Pertinent negatives include no abdominal pain, anorexia, diaphoresis, fever, genital odor, rectal pain, sore throat or urinary frequency.       The following portions of the patient's history were reviewed and updated as appropriate: allergies, current medications, past family history, past medical history, past social history, past surgical history and problem list.    Review of Systems   Constitutional: Negative for diaphoresis and fever.   HENT: Negative for sore throat.    Gastrointestinal: Negative for abdominal pain, anorexia and rectal pain.   Genitourinary: Positive for dysuria. Negative for dyspareunia, frequency and pelvic pain.       Objective   Physical Exam   Constitutional: She is oriented to person, place, and time. She appears well-developed and well-nourished.   HENT:   Head: Normocephalic.   Neck: Normal range of motion.   Pulmonary/Chest: Effort normal.   Abdominal: Soft.   Genitourinary: There is no rash, tenderness, lesion or injury on the right labia. There is no rash, tenderness, lesion or injury on the left labia. No erythema, tenderness or bleeding in the vagina. No foreign body in the vagina. No signs of injury around the vagina. No vaginal discharge found.   Genitourinary Comments: Vag panel & GC swab obtained.  "   Musculoskeletal: Normal range of motion.   Neurological: She is alert and oriented to person, place, and time.   Skin: Skin is warm and dry.   Psychiatric: She has a normal mood and affect. Her behavior is normal.   Nursing note and vitals reviewed.        Assessment/Plan   Avis was seen today for exposure to std.    Diagnoses and all orders for this visit:    Screening examination for STD (sexually transmitted disease)  -     Cancel: Chlamydia trachomatis, Neisseria gonorrhoeae, Trichomonas vaginalis, PCR - Urine, Urine, Clean Catch  -     Chlamydia trachomatis, Neisseria gonorrhoeae, Trichomonas vaginalis, PCR - Swab, Cervix    Dysuria  -     Urinalysis With Culture If Indicated - Urine, Clean Catch  -     Urine Culture - Urine, Urine, Clean Catch    Vaginal irritation  -     Gardnerella vaginalis, Trichomonas vaginalis, Candida albicans, DNA - Swab, Vagina    History of chlamydia        Will call patient for abnormal results.

## 2020-02-07 ENCOUNTER — TELEPHONE (OUTPATIENT)
Dept: OBSTETRICS AND GYNECOLOGY | Facility: CLINIC | Age: 25
End: 2020-02-07

## 2020-02-07 LAB
BACTERIA SPEC AEROBE CULT: NORMAL
C TRACH RRNA CVX QL NAA+PROBE: NEGATIVE
N GONORRHOEA RRNA SPEC QL NAA+PROBE: NEGATIVE
TRICHOMONAS VAGINALIS PCR: NEGATIVE

## 2020-02-10 ENCOUNTER — TELEPHONE (OUTPATIENT)
Dept: OBSTETRICS AND GYNECOLOGY | Facility: CLINIC | Age: 25
End: 2020-02-10

## 2020-02-10 NOTE — TELEPHONE ENCOUNTER
----- Message from SAÚL Carson sent at 2/7/2020 12:10 PM CST -----  All screening is negative. Please call patient to provide reassurance.  Thank you.

## 2020-06-25 DIAGNOSIS — M25.571 ACUTE RIGHT ANKLE PAIN: Primary | ICD-10-CM

## 2020-06-26 ENCOUNTER — OFFICE VISIT (OUTPATIENT)
Dept: ORTHOPEDIC SURGERY | Facility: CLINIC | Age: 25
End: 2020-06-26

## 2020-06-26 VITALS — WEIGHT: 172.3 LBS | BODY MASS INDEX: 33.83 KG/M2 | HEIGHT: 60 IN

## 2020-06-26 DIAGNOSIS — M25.571 ACUTE RIGHT ANKLE PAIN: Primary | ICD-10-CM

## 2020-06-26 DIAGNOSIS — M95.8 OSTEOCHONDRAL DEFECT OF ANKLE: ICD-10-CM

## 2020-06-26 PROCEDURE — 99203 OFFICE O/P NEW LOW 30 MIN: CPT | Performed by: ORTHOPAEDIC SURGERY

## 2020-06-26 NOTE — PROGRESS NOTES
Avis Ziegler is a 24 y.o. female   Primary provider:  Provider, No Known       Chief Complaint   Patient presents with   • Right Ankle - Initial Evaluation, Pain     Xray done today.  HISTORY OF PRESENT ILLNESS: Patient is being seen for her right ankle. Pain level of 3/10.  This been going on 18 months without any specific injury.  It hurts worse with activity better with rest.  She feels crunching and has occasional swelling no other joints or contralateral ankle does not hurt her.    Pain   The current episode started more than 1 year ago (2 years). The problem occurs intermittently. Associated symptoms include arthralgias and joint swelling. Pertinent negatives include no abdominal pain, chest pain, chills, fever, nausea or vomiting. Associated symptoms comments: Grinding, aching, clicking/popping/snapping, and swelling. The symptoms are aggravated by standing and walking. She has tried acetaminophen and rest for the symptoms.        CONCURRENT MEDICAL HISTORY:    Past Medical History:   Diagnosis Date   • Conjunctivitis      BILATERAL     • Encounter for insertion of intrauterine contraceptive device    • Encounter for  visit      visit status    • IUD check up    • Pain in pelvis    • Urinary tract infectious disease    • Vaginal discharge     Fishy vaginal discharge         Allergies   Allergen Reactions   • Amoxicillin Rash   • Biaxin [Clarithromycin] Rash   • Ceclor [Cefaclor] Rash   • Penicillins Rash         Current Outpatient Medications:   •  Levonorgestrel (MIRENA, 52 MG, IU), by Intrauterine route., Disp: , Rfl:     Past Surgical History:   Procedure Laterality Date   •  SECTION  2014    Primary low transverse  section. Intrauterine pregnancy at 40 5/7 weeks gestation. Induction of labor secondary to postdates. Patient requests  section   • INJECTION OF MEDICATION  2016    Marta (1)  Elizabeth Palacios       Family History   Problem  "Relation Age of Onset   • Anxiety disorder Mother    • Kidney nephrosis Mother    • Heart disease Father         Leaky heart valve   • Hyperlipidemia Father         Hypercholesterolemia   • Sleep disorder Father    • Breast cancer Other    • Depression Other    • Hypertension Other    • Diabetes Other    • Cancer Other        Social History     Socioeconomic History   • Marital status:      Spouse name: Not on file   • Number of children: Not on file   • Years of education: Not on file   • Highest education level: Not on file   Tobacco Use   • Smoking status: Never Smoker   • Smokeless tobacco: Never Used   Substance and Sexual Activity   • Alcohol use: No   • Drug use: No   • Sexual activity: Defer        Review of Systems   Constitutional: Positive for activity change. Negative for chills and fever.   HENT: Negative.  Negative for facial swelling.    Eyes: Negative.    Respiratory: Negative.  Negative for apnea and shortness of breath.    Cardiovascular: Negative.  Negative for chest pain and leg swelling.   Gastrointestinal: Negative.  Negative for abdominal pain, nausea and vomiting.   Endocrine: Negative.    Genitourinary: Negative.  Negative for dysuria.   Musculoskeletal: Positive for arthralgias, gait problem and joint swelling.   Skin: Negative.  Negative for color change.   Allergic/Immunologic: Negative.    Neurological: Negative for seizures and syncope.   Hematological: Negative.  Negative for adenopathy.   Psychiatric/Behavioral: Negative.  Negative for dysphoric mood.         PHYSICAL EXAMINATION:       Ht 152.4 cm (60\")   Wt 78.2 kg (172 lb 4.8 oz)   BMI 33.65 kg/m²     Physical Exam   Constitutional: She is oriented to person, place, and time. She appears well-developed.   HENT:   Head: Normocephalic and atraumatic.   Eyes: Pupils are equal, round, and reactive to light. EOM are normal.   Neck: Neck supple.   Pulmonary/Chest: Effort normal.   Musculoskeletal: Normal range of motion. She " exhibits tenderness.   Neurological: She is alert and oriented to person, place, and time.   Skin: Skin is warm and dry.   Psychiatric: She has a normal mood and affect.       GAIT:     [x]  Normal  []  Antalgic    Assistive device: [x]  None  []  Walker     []  Crutches  []  Cane     []  Wheelchair  []  Stretcher    Ortho Exam  Tender medially and laterally of the anterior ankle.  Motion well-maintained minimal swelling today neurovascularly intact.  Calf is negative good capillary refill        No results found.  X-rays show the previous chondral defect/AVN of the medial talar dome.  No acute findings.      ASSESSMENT:    Diagnoses and all orders for this visit:    Acute right ankle pain  -     MRI Ankle Right Without Contrast; Future    Osteochondral defect of ankle          PLAN clearly she has a defect on her x-ray.  We need to investigate this further with MRI scan.  I will put her in a boot in the meantime.  I think this is can take quite a while to heal explained this to her I will see her back after MRI scan.  Body mass index is 33.65 kg/m².  No follow-ups on file.      This document has been electronically signed by Lawrence Deutsch MD on June 26, 2020 13:04      Answers for HPI/ROS submitted by the patient on 6/18/2020   What is the primary reason for your visit?: Other  Please describe your symptoms.: My right ankle has been hurting me for sometime. Its very weak and gives out on me and almost makes me fall.  Have you had these symptoms before?: No  How long have you been having these symptoms?: Greater than 2 weeks  Please list any medications you are currently taking for this condition.: No

## 2020-07-06 ENCOUNTER — HOSPITAL ENCOUNTER (OUTPATIENT)
Dept: MRI IMAGING | Facility: HOSPITAL | Age: 25
Discharge: HOME | End: 2020-07-06
Admitting: ORTHOPAEDIC SURGERY

## 2020-07-06 DIAGNOSIS — M25.571 ACUTE RIGHT ANKLE PAIN: ICD-10-CM

## 2020-07-06 PROCEDURE — 73721 MRI JNT OF LWR EXTRE W/O DYE: CPT

## 2020-07-08 ENCOUNTER — OFFICE VISIT (OUTPATIENT)
Dept: ORTHOPEDIC SURGERY | Facility: CLINIC | Age: 25
End: 2020-07-08

## 2020-07-08 VITALS — HEIGHT: 60 IN | BODY MASS INDEX: 34.87 KG/M2 | WEIGHT: 177.6 LBS

## 2020-07-08 DIAGNOSIS — M25.571 ACUTE RIGHT ANKLE PAIN: Primary | ICD-10-CM

## 2020-07-08 DIAGNOSIS — M87.071 AVASCULAR NECROSIS OF RIGHT TALUS (HCC): ICD-10-CM

## 2020-07-08 DIAGNOSIS — M95.8 OSTEOCHONDRAL DEFECT OF ANKLE: ICD-10-CM

## 2020-07-08 PROCEDURE — 99213 OFFICE O/P EST LOW 20 MIN: CPT | Performed by: ORTHOPAEDIC SURGERY

## 2020-07-08 NOTE — PROGRESS NOTES
"Avis Ziegler is a 24 y.o. female returns for     Chief Complaint   Patient presents with   • Right Ankle - Follow-up   • Results     MRI       HISTORY OF PRESENT ILLNESS: Patient being seen for right ankle follow up. MRI done at , would like to discuss findings.  The boot is helping her some.  Less pain and discomfort.  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       CONCURRENT MEDICAL HISTORY:    Past Medical History:   Diagnosis Date   • Conjunctivitis      BILATERAL     • Encounter for insertion of intrauterine contraceptive device    • Encounter for  visit      visit status    • IUD check up    • Pain in pelvis    • Urinary tract infectious disease    • Vaginal discharge     Fishy vaginal discharge         Allergies   Allergen Reactions   • Amoxicillin Rash   • Biaxin [Clarithromycin] Rash   • Ceclor [Cefaclor] Rash   • Penicillins Rash         Current Outpatient Medications:   •  Levonorgestrel (MIRENA, 52 MG, IU), by Intrauterine route., Disp: , Rfl:     Past Surgical History:   Procedure Laterality Date   •  SECTION  2014    Primary low transverse  section. Intrauterine pregnancy at 40 5/7 weeks gestation. Induction of labor secondary to postdates. Patient requests  section   • INJECTION OF MEDICATION  2016    Marta (1)  Elizabeth Palacios           ROS: Review of systems has been updated as of today's date.  All other systems are negative except as noted previously.    PHYSICAL EXAMINATION:       Ht 152.4 cm (60\")   Wt 80.6 kg (177 lb 9.6 oz)   BMI 34.69 kg/m²     Physical Exam   Constitutional: She is oriented to person, place, and time. She appears well-developed.   HENT:   Head: Normocephalic and atraumatic.   Eyes: Pupils are equal, round, and reactive to light. EOM are normal.   Neck: Neck supple.   Pulmonary/Chest: Effort normal.   Musculoskeletal: Normal range of motion. She exhibits tenderness. She " exhibits no deformity.   Neurological: She is alert and oriented to person, place, and time.   Skin: Skin is warm and dry.   Psychiatric: She has a normal mood and affect.       GAIT:     [x]  Normal  []  Antalgic    Assistive device: [x]  None  []  Walker     []  Crutches  []  Cane     []  Wheelchair  []  Stretcher    Ortho Exam  Tender as before motion well-maintained minimal swelling.    Xr Ankle 3+ View Right    Result Date: 6/27/2020  Narrative: 3 views right ankle without comparison Overall ankle mortise appears intact there appears osteochondral defect over the medial corner of the talus with some irregularity here.  No displaced fragments are noted.  Could represent old injury versus avascular necrosis Impression: Osteochondral abnormality superior medial border of the talus.  Possible AVN versus osteochondral defect.  Recommend further imaging study for evaluation    Mri Ankle Right Without Contrast    Result Date: 7/6/2020  Narrative: MRI right ankle. HISTORY: Right ankle pain. Prior exam: Right ankle June 26, 2020. TECHNIQUE: Multiplanar multisequence noncontrast images right ankle. There is a large crescent-shaped osteochondral defect in the talar dome, medial aspect. This is diagnostic for a large area of osteonecrosis. T2-weighted images demonstrate increased signal intensity towards the periphery of the lesion therefore suggesting potentially free fragments. There is also slight depression of this osteochondral lesion. This lesion measures 1.65 x 1.09 cm. Series 4 image 14. Small posterior ankle effusion. Normal Achilles tendon. Normal peroneus longus and brevis tendons. Normal posterior tibialis, flexor hallucis and flexor digitorum tendons.     Impression: Large crescent-shaped osteochondral lesion talar dome medial aspect. This is diagnostic for a large area of osteoporosis. T2-weighted images suggests increased signal intensity towards the periphery. This suggests potentially free fragments. There  is also slight depression of portions of this osteochondral lesion. Small posterior ankle joint effusion. MRI right ankle is otherwise unremarkable. Electronically signed by:  Silvestre Johnson MD  7/6/2020 3:29 PM CDT Workstation: [id number]  I have reviewed this MRI scan agree with the findings          ASSESSMENT:    Diagnoses and all orders for this visit:    Acute right ankle pain    Osteochondral defect of ankle    Avascular necrosis of right talus (CMS/HCC)          PLAN I evaluated gone over this with her.  I have shown her her findings.  She is better in the boot we are treated clinically for a while.  I am very concerned about her going back to climbing ladders and being on this with impact even in a month now.  We will check her to see how she is and perhaps x-ray and arrival in about a month.  I might refer her to 1 of the foot and ankle people as well as I am concerned with the long-term nature of this.  Some very difficult to get to from a surgical standpoint.  She might be a candidate for an oats type procedure.    Patient's Body mass index is 34.69 kg/m². BMI is above normal parameters. Recommendations include: exercise counseling and nutrition counseling.      No follow-ups on file.      This document has been electronically signed by Lawrence Deutsch MD on July 8, 2020 16:43

## 2020-08-05 DIAGNOSIS — M25.571 ACUTE RIGHT ANKLE PAIN: Primary | ICD-10-CM

## 2020-08-07 ENCOUNTER — OFFICE VISIT (OUTPATIENT)
Dept: ORTHOPEDIC SURGERY | Facility: CLINIC | Age: 25
End: 2020-08-07

## 2020-08-07 VITALS — BODY MASS INDEX: 36.18 KG/M2 | HEIGHT: 60 IN | WEIGHT: 184.3 LBS

## 2020-08-07 DIAGNOSIS — M87.071 AVASCULAR NECROSIS OF RIGHT TALUS (HCC): ICD-10-CM

## 2020-08-07 DIAGNOSIS — M95.8 OSTEOCHONDRAL DEFECT OF ANKLE: ICD-10-CM

## 2020-08-07 DIAGNOSIS — M25.571 ACUTE RIGHT ANKLE PAIN: Primary | ICD-10-CM

## 2020-08-07 PROCEDURE — 99213 OFFICE O/P EST LOW 20 MIN: CPT | Performed by: ORTHOPAEDIC SURGERY

## 2020-08-07 NOTE — PROGRESS NOTES
"Avis Ziegler is a 24 y.o. female returns for     Chief Complaint   Patient presents with   • Right Ankle - Follow-up       HISTORY OF PRESENT ILLNESS:  Follow up right ankle.  Xray today.  She feels better today less pain or discomfort she has been wearing the boot for about 6 weeks.       CONCURRENT MEDICAL HISTORY:    The following portions of the patient's history were reviewed and updated as appropriate: allergies, current medications, past family history, past medical history, past social history, past surgical history and problem list.     ROS  No fevers or chills.  No chest pain or shortness of air.  No GI or  disturbances.    PHYSICAL EXAMINATION:       Ht 152.4 cm (60\")   Wt 83.6 kg (184 lb 4.8 oz)   BMI 35.99 kg/m²     Physical Exam   Constitutional: She is oriented to person, place, and time. She appears well-developed and well-nourished.   HENT:   Head: Normocephalic and atraumatic.   Eyes: Pupils are equal, round, and reactive to light. EOM are normal.   Neck: Neck supple.   Pulmonary/Chest: Effort normal.   Musculoskeletal: Normal range of motion.   Neurological: She is alert and oriented to person, place, and time.   Skin: Skin is warm and dry.   Psychiatric: She has a normal mood and affect.   Vitals reviewed.      GAIT:     []  Normal  [x]  Antalgic    Assistive device: [x]  Walking boot  []  Walker     []  Crutches  []  Cane     []  Wheelchair  []  Stretcher    Ortho Exam  Neurovascular intact.  Not particular tender good motion.  Neurovascular intact.    No results found.          ASSESSMENT:    Diagnoses and all orders for this visit:    Acute right ankle pain    Osteochondral defect of ankle    Avascular necrosis of right talus (CMS/[facility])          PLAN this point she is better I want her to start weaning out of her boot she has been in it for 6 weeks.  Gradually get her motion back in a walking was here in 2 weeks may be able to discuss getting her back to work at that time.  If her " pain recurs I will get on the foot and ankle guys to see her.    Patient's Body mass index is 35.99 kg/m². BMI is above normal parameters. Recommendations include: exercise counseling and nutrition counseling.      No follow-ups on file.    Lawrence Deutsch MD

## 2020-08-28 ENCOUNTER — OFFICE VISIT (OUTPATIENT)
Dept: ORTHOPEDIC SURGERY | Facility: CLINIC | Age: 25
End: 2020-08-28

## 2020-08-28 VITALS — HEIGHT: 60 IN | WEIGHT: 187 LBS | BODY MASS INDEX: 36.71 KG/M2

## 2020-08-28 DIAGNOSIS — M25.571 ACUTE RIGHT ANKLE PAIN: Primary | ICD-10-CM

## 2020-08-28 DIAGNOSIS — M87.071 AVASCULAR NECROSIS OF RIGHT TALUS (HCC): ICD-10-CM

## 2020-08-28 DIAGNOSIS — M95.8 OSTEOCHONDRAL DEFECT OF ANKLE: ICD-10-CM

## 2020-08-28 PROCEDURE — 99213 OFFICE O/P EST LOW 20 MIN: CPT | Performed by: ORTHOPAEDIC SURGERY

## 2020-08-28 NOTE — PROGRESS NOTES
"Avis Ziegler is a 24 y.o. female returns for     Chief Complaint   Patient presents with   • Right Ankle - Follow-up       HISTORY OF PRESENT ILLNESS: Patient being seen for right ankle follow up.  Feels better has occasional swelling and soreness.       CONCURRENT MEDICAL HISTORY:    Past Medical History:   Diagnosis Date   • Conjunctivitis      BILATERAL     • Encounter for insertion of intrauterine contraceptive device    • Encounter for  visit      visit status    • IUD check up    • Pain in pelvis    • Urinary tract infectious disease    • Vaginal discharge     Fishy vaginal discharge         Allergies   Allergen Reactions   • Amoxicillin Rash   • Biaxin [Clarithromycin] Rash   • Ceclor [Cefaclor] Rash   • Penicillins Rash         Current Outpatient Medications:   •  Levonorgestrel (MIRENA, 52 MG, IU), by Intrauterine route., Disp: , Rfl:     Past Surgical History:   Procedure Laterality Date   •  SECTION  2014    Primary low transverse  section. Intrauterine pregnancy at 40 5/7 weeks gestation. Induction of labor secondary to postdates. Patient requests  section   • INJECTION OF MEDICATION  2016    Marta (1)  Elizabeth Palacios           ROS: Review of systems has been updated as of today's date.  All other systems are negative except as noted previously.    PHYSICAL EXAMINATION:       Ht 152.4 cm (60\")   Wt 84.8 kg (187 lb)   BMI 36.52 kg/m²     Physical Exam   Constitutional: She is oriented to person, place, and time. She appears well-developed.   HENT:   Head: Normocephalic and atraumatic.   Eyes: Pupils are equal, round, and reactive to light. EOM are normal.   Neck: Neck supple.   Pulmonary/Chest: Effort normal.   Musculoskeletal: Normal range of motion. She exhibits tenderness.   Neurological: She is alert and oriented to person, place, and time.   Skin: Skin is warm and dry.   Psychiatric: She has a normal mood and affect.       GAIT:     "    [x]  Normal  []  Antalgic    Assistive device: [x]  None  []  Walker     []  Crutches  []  Cane     []  Wheelchair  []  Stretcher    Ortho Exam  Minimal swelling.  Moves fairly well.  Neurovascular intact    Xr Ankle 3+ View Right    Result Date: 8/20/2020  Narrative: 3 views right ankle comparison to prior film Again seen is a osteochondral defect of the medial dome of the talus.  No significant difference from previous x-ray.  No loose bodies noted. Impression: Osteochondral defect medial talar dome without change            ASSESSMENT:    Diagnoses and all orders for this visit:    Acute right ankle pain    Osteochondral defect of ankle    Avascular necrosis of right talus (CMS/HCC)          PLAN  Go back to work.  We will try a lace up ankle brace.  She will call with any problems.  Her problems recur may get her to see 1 of the foot and ankle guys.  I have also discussed with her again perhaps considering changing her occupation to do something little more sedentary she is not on her feet all day and decreasing some of the impact on her ankle    Patient's Body mass index is 36.52 kg/m². BMI is above normal parameters. Recommendations include: exercise counseling and nutrition counseling.      No follow-ups on file.      This document has been electronically signed by Lawrence Deutsch MD on August 28, 2020 09:59

## 2021-01-01 PROCEDURE — U0003 INFECTIOUS AGENT DETECTION BY NUCLEIC ACID (DNA OR RNA); SEVERE ACUTE RESPIRATORY SYNDROME CORONAVIRUS 2 (SARS-COV-2) (CORONAVIRUS DISEASE [COVID-19]), AMPLIFIED PROBE TECHNIQUE, MAKING USE OF HIGH THROUGHPUT TECHNOLOGIES AS DESCRIBED BY CMS-2020-01-R: HCPCS | Performed by: NURSE PRACTITIONER

## 2021-03-16 ENCOUNTER — LAB (OUTPATIENT)
Dept: LAB | Facility: HOSPITAL | Age: 26
End: 2021-03-16

## 2021-03-16 ENCOUNTER — OFFICE VISIT (OUTPATIENT)
Dept: OBSTETRICS AND GYNECOLOGY | Facility: CLINIC | Age: 26
End: 2021-03-16

## 2021-03-16 VITALS
SYSTOLIC BLOOD PRESSURE: 110 MMHG | WEIGHT: 177 LBS | BODY MASS INDEX: 34.75 KG/M2 | HEIGHT: 60 IN | DIASTOLIC BLOOD PRESSURE: 60 MMHG

## 2021-03-16 DIAGNOSIS — Z11.3 SCREEN FOR STD (SEXUALLY TRANSMITTED DISEASE): Primary | ICD-10-CM

## 2021-03-16 LAB
HBV SURFACE AG SERPL QL IA: NORMAL
HCV AB SER DONR QL: NORMAL
HIV1+2 AB SER QL: NORMAL
RPR SER QL: NORMAL

## 2021-03-16 PROCEDURE — 99213 OFFICE O/P EST LOW 20 MIN: CPT | Performed by: NURSE PRACTITIONER

## 2021-03-16 PROCEDURE — 36415 COLL VENOUS BLD VENIPUNCTURE: CPT | Performed by: NURSE PRACTITIONER

## 2021-03-16 PROCEDURE — 86592 SYPHILIS TEST NON-TREP QUAL: CPT | Performed by: NURSE PRACTITIONER

## 2021-03-16 PROCEDURE — G0432 EIA HIV-1/HIV-2 SCREEN: HCPCS | Performed by: NURSE PRACTITIONER

## 2021-03-16 PROCEDURE — 87591 N.GONORRHOEAE DNA AMP PROB: CPT | Performed by: NURSE PRACTITIONER

## 2021-03-16 PROCEDURE — 87661 TRICHOMONAS VAGINALIS AMPLIF: CPT | Performed by: NURSE PRACTITIONER

## 2021-03-16 PROCEDURE — 87491 CHLMYD TRACH DNA AMP PROBE: CPT | Performed by: NURSE PRACTITIONER

## 2021-03-16 PROCEDURE — 86803 HEPATITIS C AB TEST: CPT | Performed by: NURSE PRACTITIONER

## 2021-03-16 PROCEDURE — 87340 HEPATITIS B SURFACE AG IA: CPT | Performed by: NURSE PRACTITIONER

## 2021-03-16 NOTE — PROGRESS NOTES
Subjective   Avis Ziegler is a 25 y.o. here for STD screening    Avis Ziegler is a 25 yr old  who presents today for full STD screening. Pt with a previous hx of chlamydia. Has a sexual partner for several years; had cheated in the past. Pt denies any symptoms today. Uses  Mirena for birth control and has no problems. Last Pap, 3/11/2019, ASCUS negative HPV.     Exposure to STD   The patient's pertinent negatives include no discharge, dyspareunia, dysuria, genital itching, genital lesions, genital rash or pelvic pain. The vaginal discharge was normal. Pertinent negatives include no abdominal pain, anorexia, diaphoresis, fever, genital odor, rectal pain, sore throat or urinary frequency. Risk factors include history of STDs.       The following portions of the patient's history were reviewed and updated as appropriate: allergies, current medications, past family history, past medical history, past social history, past surgical history and problem list.    Review of Systems   Constitutional: Negative for diaphoresis and fever.   HENT: Negative for sore throat.    Gastrointestinal: Negative for abdominal pain, anorexia and rectal pain.   Genitourinary: Negative for dyspareunia, dysuria, frequency and pelvic pain.       Objective   Physical Exam  Vitals and nursing note reviewed.   Constitutional:       Appearance: She is well-developed. She is obese.   HENT:      Head: Normocephalic and atraumatic.   Neck:      Thyroid: No thyromegaly.   Cardiovascular:      Heart sounds: Normal heart sounds.   Pulmonary:      Effort: Pulmonary effort is normal. No respiratory distress.   Musculoskeletal:         General: Normal range of motion.   Skin:     General: Skin is warm and dry.   Neurological:      Mental Status: She is alert and oriented to person, place, and time.   Psychiatric:         Behavior: Behavior normal.           Assessment/Plan   Diagnoses and all orders for this visit:    1. Screen for STD  (sexually transmitted disease) (Primary)  -     HIV-1 & HIV-2 Antibodies  -     Hepatitis C Antibody  -     Hepatitis B Surface Antigen  -     RPR  -     Chlamydia trachomatis, Neisseria gonorrhoeae, Trichomonas vaginalis, PCR - Urine, Urine, Clean Catch        Will call patient with abnormal results of STD screening. Pt is aware her pap smear is due next March 2022.

## 2021-03-17 LAB
C TRACH RRNA CVX QL NAA+PROBE: NEGATIVE
N GONORRHOEA RRNA SPEC QL NAA+PROBE: NEGATIVE
TRICHOMONAS VAGINALIS PCR: NEGATIVE

## 2021-08-13 PROCEDURE — 87635 SARS-COV-2 COVID-19 AMP PRB: CPT | Performed by: NURSE PRACTITIONER

## 2021-08-21 ENCOUNTER — HOSPITAL ENCOUNTER (EMERGENCY)
Facility: HOSPITAL | Age: 26
Discharge: HOME | End: 2021-08-21
Attending: STUDENT IN AN ORGANIZED HEALTH CARE EDUCATION/TRAINING PROGRAM | Admitting: STUDENT IN AN ORGANIZED HEALTH CARE EDUCATION/TRAINING PROGRAM

## 2021-08-21 VITALS
DIASTOLIC BLOOD PRESSURE: 78 MMHG | TEMPERATURE: 97.7 F | HEIGHT: 60 IN | WEIGHT: 178 LBS | OXYGEN SATURATION: 100 % | RESPIRATION RATE: 18 BRPM | SYSTOLIC BLOOD PRESSURE: 142 MMHG | HEART RATE: 98 BPM | BODY MASS INDEX: 34.95 KG/M2

## 2021-08-21 DIAGNOSIS — U07.1 COVID-19: Primary | ICD-10-CM

## 2021-08-21 DIAGNOSIS — A08.4 VIRAL GASTROENTERITIS: ICD-10-CM

## 2021-08-21 LAB
ALBUMIN SERPL-MCNC: 4.1 G/DL (ref 3.5–5.2)
ALBUMIN/GLOB SERPL: 1.3 G/DL
ALP SERPL-CCNC: 85 U/L (ref 39–117)
ALT SERPL W P-5'-P-CCNC: 30 U/L (ref 1–33)
ANION GAP SERPL CALCULATED.3IONS-SCNC: 12 MMOL/L (ref 5–15)
AST SERPL-CCNC: 22 U/L (ref 1–32)
B-HCG UR QL: NEGATIVE
BACTERIA UR QL AUTO: ABNORMAL /HPF
BASOPHILS # BLD AUTO: 0.03 10*3/MM3 (ref 0–0.2)
BASOPHILS NFR BLD AUTO: 0.5 % (ref 0–1.5)
BILIRUB SERPL-MCNC: 0.3 MG/DL (ref 0–1.2)
BILIRUB UR QL STRIP: NEGATIVE
BUN SERPL-MCNC: 16 MG/DL (ref 6–20)
BUN/CREAT SERPL: 21.3 (ref 7–25)
CALCIUM SPEC-SCNC: 8.8 MG/DL (ref 8.6–10.5)
CHLORIDE SERPL-SCNC: 102 MMOL/L (ref 98–107)
CLARITY UR: ABNORMAL
CO2 SERPL-SCNC: 25 MMOL/L (ref 22–29)
COLOR UR: YELLOW
CREAT SERPL-MCNC: 0.75 MG/DL (ref 0.57–1)
DEPRECATED RDW RBC AUTO: 37.1 FL (ref 37–54)
EOSINOPHIL # BLD AUTO: 0.02 10*3/MM3 (ref 0–0.4)
EOSINOPHIL NFR BLD AUTO: 0.3 % (ref 0.3–6.2)
ERYTHROCYTE [DISTWIDTH] IN BLOOD BY AUTOMATED COUNT: 11.9 % (ref 12.3–15.4)
GFR SERPL CREATININE-BSD FRML MDRD: 93 ML/MIN/1.73
GLOBULIN UR ELPH-MCNC: 3.1 GM/DL
GLUCOSE SERPL-MCNC: 98 MG/DL (ref 65–99)
GLUCOSE UR STRIP-MCNC: NEGATIVE MG/DL
HCT VFR BLD AUTO: 44 % (ref 34–46.6)
HGB BLD-MCNC: 15.3 G/DL (ref 12–15.9)
HGB UR QL STRIP.AUTO: ABNORMAL
HOLD SPECIMEN: NORMAL
HYALINE CASTS UR QL AUTO: ABNORMAL /LPF
IMM GRANULOCYTES # BLD AUTO: 0.1 10*3/MM3 (ref 0–0.05)
IMM GRANULOCYTES NFR BLD AUTO: 1.5 % (ref 0–0.5)
KETONES UR QL STRIP: NEGATIVE
LEUKOCYTE ESTERASE UR QL STRIP.AUTO: NEGATIVE
LYMPHOCYTES # BLD AUTO: 1.96 10*3/MM3 (ref 0.7–3.1)
LYMPHOCYTES NFR BLD AUTO: 29.4 % (ref 19.6–45.3)
MCH RBC QN AUTO: 29.9 PG (ref 26.6–33)
MCHC RBC AUTO-ENTMCNC: 34.8 G/DL (ref 31.5–35.7)
MCV RBC AUTO: 85.9 FL (ref 79–97)
MONOCYTES # BLD AUTO: 0.51 10*3/MM3 (ref 0.1–0.9)
MONOCYTES NFR BLD AUTO: 7.7 % (ref 5–12)
MUCOUS THREADS URNS QL MICRO: ABNORMAL /HPF
NEUTROPHILS NFR BLD AUTO: 4.04 10*3/MM3 (ref 1.7–7)
NEUTROPHILS NFR BLD AUTO: 60.6 % (ref 42.7–76)
NITRITE UR QL STRIP: NEGATIVE
NRBC BLD AUTO-RTO: 0 /100 WBC (ref 0–0.2)
PH UR STRIP.AUTO: 5.5 [PH] (ref 5–9)
PLATELET # BLD AUTO: 119 10*3/MM3 (ref 140–450)
PMV BLD AUTO: 9.4 FL (ref 6–12)
POTASSIUM SERPL-SCNC: 3.9 MMOL/L (ref 3.5–5.2)
PROT SERPL-MCNC: 7.2 G/DL (ref 6–8.5)
PROT UR QL STRIP: NEGATIVE
RBC # BLD AUTO: 5.12 10*6/MM3 (ref 3.77–5.28)
RBC # UR: ABNORMAL /HPF
REF LAB TEST METHOD: ABNORMAL
SODIUM SERPL-SCNC: 139 MMOL/L (ref 136–145)
SP GR UR STRIP: 1.03 (ref 1–1.03)
SQUAMOUS #/AREA URNS HPF: ABNORMAL /HPF
UROBILINOGEN UR QL STRIP: ABNORMAL
WBC # BLD AUTO: 6.66 10*3/MM3 (ref 3.4–10.8)
WBC UR QL AUTO: ABNORMAL /HPF
WHOLE BLOOD HOLD SPECIMEN: NORMAL
YEAST URNS QL MICRO: ABNORMAL /HPF

## 2021-08-21 PROCEDURE — 96376 TX/PRO/DX INJ SAME DRUG ADON: CPT

## 2021-08-21 PROCEDURE — 99283 EMERGENCY DEPT VISIT LOW MDM: CPT

## 2021-08-21 PROCEDURE — 25010000002 ONDANSETRON PER 1 MG: Performed by: NURSE PRACTITIONER

## 2021-08-21 PROCEDURE — 81025 URINE PREGNANCY TEST: CPT | Performed by: NURSE PRACTITIONER

## 2021-08-21 PROCEDURE — 81001 URINALYSIS AUTO W/SCOPE: CPT | Performed by: NURSE PRACTITIONER

## 2021-08-21 PROCEDURE — 85025 COMPLETE CBC W/AUTO DIFF WBC: CPT | Performed by: NURSE PRACTITIONER

## 2021-08-21 PROCEDURE — 80053 COMPREHEN METABOLIC PANEL: CPT | Performed by: NURSE PRACTITIONER

## 2021-08-21 PROCEDURE — 96374 THER/PROPH/DIAG INJ IV PUSH: CPT

## 2021-08-21 RX ORDER — ONDANSETRON 2 MG/ML
4 INJECTION INTRAMUSCULAR; INTRAVENOUS ONCE
Status: COMPLETED | OUTPATIENT
Start: 2021-08-21 | End: 2021-08-21

## 2021-08-21 RX ORDER — PROMETHAZINE HYDROCHLORIDE 25 MG/1
25 TABLET ORAL EVERY 6 HOURS PRN
Qty: 20 TABLET | Refills: 0 | Status: SHIPPED | OUTPATIENT
Start: 2021-08-21 | End: 2022-04-18

## 2021-08-21 RX ORDER — SODIUM CHLORIDE 0.9 % (FLUSH) 0.9 %
10 SYRINGE (ML) INJECTION AS NEEDED
Status: DISCONTINUED | OUTPATIENT
Start: 2021-08-21 | End: 2021-08-21 | Stop reason: HOSPADM

## 2021-08-21 RX ORDER — ONDANSETRON 4 MG/1
4 TABLET, FILM COATED ORAL EVERY 4 HOURS PRN
Qty: 15 TABLET | Refills: 0 | Status: SHIPPED | OUTPATIENT
Start: 2021-08-21 | End: 2022-04-18

## 2021-08-21 RX ADMIN — ONDANSETRON HYDROCHLORIDE 4 MG: 2 INJECTION, SOLUTION INTRAMUSCULAR; INTRAVENOUS at 17:47

## 2021-08-21 RX ADMIN — ONDANSETRON HYDROCHLORIDE 4 MG: 2 INJECTION, SOLUTION INTRAMUSCULAR; INTRAVENOUS at 18:40

## 2021-08-21 RX ADMIN — SODIUM CHLORIDE 1000 ML: 900 INJECTION, SOLUTION INTRAVENOUS at 17:49

## 2021-08-22 NOTE — ED PROVIDER NOTES
Subjective   25-year-old female in the emergency department 8 days post Covid diagnosis complaining of nausea vomiting abdominal pain.  The symptoms started 3 days ago.  Got worse today.  Describes the abdominal pain as a cramping type pain.      History provided by:  Patient   used: No        Review of Systems   Constitutional: Negative for chills and fatigue.   HENT: Negative for congestion.    Respiratory: Negative for shortness of breath.    Cardiovascular: Negative for chest pain and palpitations.   Gastrointestinal: Positive for abdominal pain, nausea and vomiting.   Genitourinary: Negative for flank pain.   Musculoskeletal: Negative for neck stiffness.   Skin: Negative for wound.   Allergic/Immunologic: Negative for immunocompromised state.   Neurological: Positive for weakness.   Hematological: Negative for adenopathy.   Psychiatric/Behavioral: Negative for confusion.   All other systems reviewed and are negative.      Past Medical History:   Diagnosis Date   • Conjunctivitis      BILATERAL     • Encounter for insertion of intrauterine contraceptive device    • Encounter for  visit      visit status    • IUD check up    • Pain in pelvis    • Urinary tract infectious disease    • Vaginal discharge     Fishy vaginal discharge         Allergies   Allergen Reactions   • Amoxicillin Rash   • Biaxin [Clarithromycin] Rash   • Ceclor [Cefaclor] Rash   • Penicillins Rash       Past Surgical History:   Procedure Laterality Date   •  SECTION  2014    Primary low transverse  section. Intrauterine pregnancy at 40 5/7 weeks gestation. Induction of labor secondary to postdates. Patient requests  section   • INJECTION OF MEDICATION  2016    Marta (1)  Elizabeth Palacios       Family History   Problem Relation Age of Onset   • Anxiety disorder Mother    • Kidney nephrosis Mother    • Heart disease Father         Leaky heart valve   • Hyperlipidemia  Father         Hypercholesterolemia   • Sleep disorder Father    • Breast cancer Other    • Depression Other    • Hypertension Other    • Diabetes Other    • Cancer Other        Social History     Socioeconomic History   • Marital status: Single     Spouse name: Not on file   • Number of children: Not on file   • Years of education: Not on file   • Highest education level: Not on file   Tobacco Use   • Smoking status: Never Smoker   • Smokeless tobacco: Never Used   Substance and Sexual Activity   • Alcohol use: No   • Drug use: No   • Sexual activity: Defer           Objective   Physical Exam  Vitals and nursing note reviewed.   Constitutional:       Appearance: She is well-developed.   HENT:      Head: Normocephalic.      Nose: Nose normal.   Eyes:      Conjunctiva/sclera: Conjunctivae normal.      Pupils: Pupils are equal, round, and reactive to light.   Cardiovascular:      Rate and Rhythm: Normal rate and regular rhythm.      Heart sounds: Normal heart sounds.   Pulmonary:      Effort: Pulmonary effort is normal.      Breath sounds: Normal breath sounds.   Abdominal:      Palpations: Abdomen is soft.      Tenderness: There is generalized abdominal tenderness.   Musculoskeletal:         General: Normal range of motion.      Cervical back: Normal range of motion.   Skin:     General: Skin is warm and dry.   Neurological:      Mental Status: She is alert and oriented to person, place, and time.      GCS: GCS eye subscore is 4. GCS verbal subscore is 5. GCS motor subscore is 6.         Procedures           ED Course        Results for orders placed or performed during the hospital encounter of 08/21/21   Comprehensive Metabolic Panel    Specimen: Blood   Result Value Ref Range    Glucose 98 65 - 99 mg/dL    BUN 16 6 - 20 mg/dL    Creatinine 0.75 0.57 - 1.00 mg/dL    Sodium 139 136 - 145 mmol/L    Potassium 3.9 3.5 - 5.2 mmol/L    Chloride 102 98 - 107 mmol/L    CO2 25.0 22.0 - 29.0 mmol/L    Calcium 8.8 8.6 - 10.5  mg/dL    Total Protein 7.2 6.0 - 8.5 g/dL    Albumin 4.10 3.50 - 5.20 g/dL    ALT (SGPT) 30 1 - 33 U/L    AST (SGOT) 22 1 - 32 U/L    Alkaline Phosphatase 85 39 - 117 U/L    Total Bilirubin 0.3 0.0 - 1.2 mg/dL    eGFR Non African Amer 93 >60 mL/min/1.73    Globulin 3.1 gm/dL    A/G Ratio 1.3 g/dL    BUN/Creatinine Ratio 21.3 7.0 - 25.0    Anion Gap 12.0 5.0 - 15.0 mmol/L   Urinalysis With Microscopic If Indicated (No Culture) - Urine, Clean Catch    Specimen: Urine, Clean Catch   Result Value Ref Range    Color, UA Yellow Yellow, Straw, Dark Yellow, Tamara    Appearance, UA Cloudy (A) Clear    pH, UA 5.5 5.0 - 9.0    Specific Gravity, UA 1.027 1.003 - 1.030    Glucose, UA Negative Negative    Ketones, UA Negative Negative    Bilirubin, UA Negative Negative    Blood, UA Trace (A) Negative    Protein, UA Negative Negative    Leuk Esterase, UA Negative Negative    Nitrite, UA Negative Negative    Urobilinogen, UA 0.2 E.U./dL 0.2 - 1.0 E.U./dL   Pregnancy, Urine - Urine, Clean Catch    Specimen: Urine, Clean Catch   Result Value Ref Range    HCG, Urine QL Negative Negative   CBC Auto Differential    Specimen: Blood   Result Value Ref Range    WBC 6.66 3.40 - 10.80 10*3/mm3    RBC 5.12 3.77 - 5.28 10*6/mm3    Hemoglobin 15.3 12.0 - 15.9 g/dL    Hematocrit 44.0 34.0 - 46.6 %    MCV 85.9 79.0 - 97.0 fL    MCH 29.9 26.6 - 33.0 pg    MCHC 34.8 31.5 - 35.7 g/dL    RDW 11.9 (L) 12.3 - 15.4 %    RDW-SD 37.1 37.0 - 54.0 fl    MPV 9.4 6.0 - 12.0 fL    Platelets 119 (L) 140 - 450 10*3/mm3    Neutrophil % 60.6 42.7 - 76.0 %    Lymphocyte % 29.4 19.6 - 45.3 %    Monocyte % 7.7 5.0 - 12.0 %    Eosinophil % 0.3 0.3 - 6.2 %    Basophil % 0.5 0.0 - 1.5 %    Immature Grans % 1.5 (H) 0.0 - 0.5 %    Neutrophils, Absolute 4.04 1.70 - 7.00 10*3/mm3    Lymphocytes, Absolute 1.96 0.70 - 3.10 10*3/mm3    Monocytes, Absolute 0.51 0.10 - 0.90 10*3/mm3    Eosinophils, Absolute 0.02 0.00 - 0.40 10*3/mm3    Basophils, Absolute 0.03 0.00 - 0.20  10*3/mm3    Immature Grans, Absolute 0.10 (H) 0.00 - 0.05 10*3/mm3    nRBC 0.0 0.0 - 0.2 /100 WBC   Green Top (Gel)   Result Value Ref Range    Extra Tube Hold for add-ons.    Lavender Top   Result Value Ref Range    Extra Tube hold for add-on    Gold Top - SST   Result Value Ref Range    Extra Tube Hold for add-ons.                                         MDM  Number of Diagnoses or Management Options  COVID-19: new and requires workup  Viral gastroenteritis: new and requires workup  Diagnosis management comments: 25-year-old female in the emergency department today, see HPI for details.  Patient is nontoxic-appearing and reports that after fluid boluses that she feels a lot better and agreeable to discharge.  CBC CMP and abdominal exam basically benign.  Patient nontoxic-appearing.  Discharged with Zofran and Phenergan and gastroenteritis and Covid discharge instructions       Amount and/or Complexity of Data Reviewed  Clinical lab tests: reviewed    Risk of Complications, Morbidity, and/or Mortality  Presenting problems: moderate  Diagnostic procedures: moderate  Management options: moderate    Patient Progress  Patient progress: stable      Final diagnoses:   COVID-19   Viral gastroenteritis       ED Disposition  ED Disposition     ED Disposition Condition Comment    Discharge Stable           Chasity Briggs L, DO  [address]  [facility] [address] [phone]    Call in 3 days           Medication List      New Prescriptions    ondansetron 4 MG tablet  Commonly known as: ZOFRAN  Take 1 tablet by mouth Every 4 (Four) Hours As Needed for Nausea or Vomiting.     promethazine 25 MG tablet  Commonly known as: PHENERGAN  Take 1 tablet by mouth Every 6 (Six) Hours As Needed for Nausea or Vomiting.           Where to Get Your Medications      These medications were sent to [facility]/pharmacy #[id number] - [place], [place] - [facility] [phone] [facility] [phone] [address] [address]     Phone: [phone]   · ondansetron 4 MG tablet  · promethazine 25 MG tablet          Joseph Horton, APRN  08/21/21 3012

## 2022-04-19 ENCOUNTER — HOSPITAL ENCOUNTER (EMERGENCY)
Facility: HOSPITAL | Age: 27
Discharge: HOME | End: 2022-04-19
Attending: FAMILY MEDICINE | Admitting: FAMILY MEDICINE

## 2022-04-19 ENCOUNTER — APPOINTMENT (OUTPATIENT)
Dept: CT IMAGING | Facility: HOSPITAL | Age: 27
End: 2022-04-19

## 2022-04-19 VITALS
HEART RATE: 69 BPM | WEIGHT: 174 LBS | BODY MASS INDEX: 34.16 KG/M2 | SYSTOLIC BLOOD PRESSURE: 115 MMHG | HEIGHT: 60 IN | TEMPERATURE: 96.8 F | RESPIRATION RATE: 18 BRPM | DIASTOLIC BLOOD PRESSURE: 87 MMHG | OXYGEN SATURATION: 98 %

## 2022-04-19 DIAGNOSIS — N20.0 KIDNEY STONE: ICD-10-CM

## 2022-04-19 DIAGNOSIS — R10.9 ACUTE RIGHT FLANK PAIN: Primary | ICD-10-CM

## 2022-04-19 LAB
ALBUMIN SERPL-MCNC: 4.3 G/DL (ref 3.5–5.2)
ALBUMIN/GLOB SERPL: 1.5 G/DL
ALP SERPL-CCNC: 95 U/L (ref 39–117)
ALT SERPL W P-5'-P-CCNC: 85 U/L (ref 1–33)
ANION GAP SERPL CALCULATED.3IONS-SCNC: 11 MMOL/L (ref 5–15)
AST SERPL-CCNC: 63 U/L (ref 1–32)
BACTERIA UR QL AUTO: ABNORMAL /HPF
BASOPHILS # BLD AUTO: 0.03 10*3/MM3 (ref 0–0.2)
BASOPHILS NFR BLD AUTO: 0.5 % (ref 0–1.5)
BILIRUB SERPL-MCNC: 0.2 MG/DL (ref 0–1.2)
BILIRUB UR QL STRIP: NEGATIVE
BUN SERPL-MCNC: 8 MG/DL (ref 6–20)
BUN/CREAT SERPL: 11.4 (ref 7–25)
CALCIUM SPEC-SCNC: 8.9 MG/DL (ref 8.6–10.5)
CHLORIDE SERPL-SCNC: 106 MMOL/L (ref 98–107)
CLARITY UR: CLEAR
CO2 SERPL-SCNC: 23 MMOL/L (ref 22–29)
COLOR UR: YELLOW
CREAT SERPL-MCNC: 0.7 MG/DL (ref 0.57–1)
DEPRECATED RDW RBC AUTO: 36.5 FL (ref 37–54)
EGFRCR SERPLBLD CKD-EPI 2021: 121.7 ML/MIN/1.73
EOSINOPHIL # BLD AUTO: 0.15 10*3/MM3 (ref 0–0.4)
EOSINOPHIL NFR BLD AUTO: 2.3 % (ref 0.3–6.2)
ERYTHROCYTE [DISTWIDTH] IN BLOOD BY AUTOMATED COUNT: 11.5 % (ref 12.3–15.4)
GLOBULIN UR ELPH-MCNC: 2.8 GM/DL
GLUCOSE SERPL-MCNC: 103 MG/DL (ref 65–99)
GLUCOSE UR STRIP-MCNC: NEGATIVE MG/DL
HCG SERPL QL: NEGATIVE
HCT VFR BLD AUTO: 40.6 % (ref 34–46.6)
HGB BLD-MCNC: 14.2 G/DL (ref 12–15.9)
HGB UR QL STRIP.AUTO: ABNORMAL
HOLD SPECIMEN: NORMAL
HYALINE CASTS UR QL AUTO: ABNORMAL /LPF
IMM GRANULOCYTES # BLD AUTO: 0.02 10*3/MM3 (ref 0–0.05)
IMM GRANULOCYTES NFR BLD AUTO: 0.3 % (ref 0–0.5)
KETONES UR QL STRIP: NEGATIVE
LEUKOCYTE ESTERASE UR QL STRIP.AUTO: ABNORMAL
LIPASE SERPL-CCNC: 16 U/L (ref 13–60)
LYMPHOCYTES # BLD AUTO: 2.42 10*3/MM3 (ref 0.7–3.1)
LYMPHOCYTES NFR BLD AUTO: 37.7 % (ref 19.6–45.3)
MCH RBC QN AUTO: 30.5 PG (ref 26.6–33)
MCHC RBC AUTO-ENTMCNC: 35 G/DL (ref 31.5–35.7)
MCV RBC AUTO: 87.3 FL (ref 79–97)
MONOCYTES # BLD AUTO: 0.4 10*3/MM3 (ref 0.1–0.9)
MONOCYTES NFR BLD AUTO: 6.2 % (ref 5–12)
NEUTROPHILS NFR BLD AUTO: 3.4 10*3/MM3 (ref 1.7–7)
NEUTROPHILS NFR BLD AUTO: 53 % (ref 42.7–76)
NITRITE UR QL STRIP: NEGATIVE
NRBC BLD AUTO-RTO: 0 /100 WBC (ref 0–0.2)
PH UR STRIP.AUTO: 6.5 [PH] (ref 5–9)
PLATELET # BLD AUTO: 222 10*3/MM3 (ref 140–450)
PMV BLD AUTO: 9.4 FL (ref 6–12)
POTASSIUM SERPL-SCNC: 4 MMOL/L (ref 3.5–5.2)
PROT SERPL-MCNC: 7.1 G/DL (ref 6–8.5)
PROT UR QL STRIP: NEGATIVE
RBC # BLD AUTO: 4.65 10*6/MM3 (ref 3.77–5.28)
RBC # UR STRIP: ABNORMAL /HPF
REF LAB TEST METHOD: ABNORMAL
SODIUM SERPL-SCNC: 140 MMOL/L (ref 136–145)
SP GR UR STRIP: 1.02 (ref 1–1.03)
SQUAMOUS #/AREA URNS HPF: ABNORMAL /HPF
UROBILINOGEN UR QL STRIP: ABNORMAL
WBC # UR STRIP: ABNORMAL /HPF
WBC NRBC COR # BLD: 6.42 10*3/MM3 (ref 3.4–10.8)
WHOLE BLOOD HOLD SPECIMEN: NORMAL
WHOLE BLOOD HOLD SPECIMEN: NORMAL

## 2022-04-19 PROCEDURE — 83690 ASSAY OF LIPASE: CPT

## 2022-04-19 PROCEDURE — 81001 URINALYSIS AUTO W/SCOPE: CPT

## 2022-04-19 PROCEDURE — 80053 COMPREHEN METABOLIC PANEL: CPT

## 2022-04-19 PROCEDURE — 85025 COMPLETE CBC W/AUTO DIFF WBC: CPT

## 2022-04-19 PROCEDURE — 74176 CT ABD & PELVIS W/O CONTRAST: CPT

## 2022-04-19 PROCEDURE — 84703 CHORIONIC GONADOTROPIN ASSAY: CPT

## 2022-04-19 PROCEDURE — 87086 URINE CULTURE/COLONY COUNT: CPT | Performed by: FAMILY MEDICINE

## 2022-04-19 PROCEDURE — 99283 EMERGENCY DEPT VISIT LOW MDM: CPT

## 2022-04-19 RX ORDER — MELOXICAM 15 MG/1
15 TABLET ORAL DAILY
Qty: 30 TABLET | Refills: 0 | Status: SHIPPED | OUTPATIENT
Start: 2022-04-19 | End: 2022-06-13

## 2022-04-19 RX ORDER — ONDANSETRON 4 MG/1
4 TABLET, ORALLY DISINTEGRATING ORAL EVERY 6 HOURS PRN
Qty: 10 TABLET | Refills: 0 | Status: SHIPPED | OUTPATIENT
Start: 2022-04-19 | End: 2022-06-13

## 2022-04-19 RX ORDER — SODIUM CHLORIDE 0.9 % (FLUSH) 0.9 %
10 SYRINGE (ML) INJECTION AS NEEDED
Status: DISCONTINUED | OUTPATIENT
Start: 2022-04-19 | End: 2022-04-19 | Stop reason: HOSPADM

## 2022-04-19 NOTE — ED PROVIDER NOTES
Subjective   Right flank pain began yesterday.      Abdominal Pain  Pain location:  R flank  Pain quality: dull    Pain radiates to:  Does not radiate  Pain severity:  Moderate  Duration:  1 day  Timing:  Constant  Progression:  Worsening  Relieved by:  Nothing  Associated symptoms: no chest pain, no chills, no cough, no diarrhea, no dysuria, no fatigue, no fever, no nausea, no shortness of breath, no sore throat and no vomiting        Review of Systems   Constitutional: Negative for appetite change, chills, diaphoresis, fatigue and fever.   HENT: Negative for congestion, ear discharge, ear pain, nosebleeds, rhinorrhea, sinus pressure, sore throat and trouble swallowing.    Eyes: Negative for discharge and redness.   Respiratory: Negative for apnea, cough, chest tightness, shortness of breath and wheezing.    Cardiovascular: Negative for chest pain.   Gastrointestinal: Positive for abdominal pain. Negative for diarrhea, nausea and vomiting.   Endocrine: Negative for polyuria.   Genitourinary: Positive for flank pain. Negative for difficulty urinating, dysuria, frequency and urgency.   Musculoskeletal: Negative for myalgias and neck pain.   Skin: Negative for color change and rash.   Allergic/Immunologic: Negative for immunocompromised state.   Neurological: Negative for dizziness, seizures, syncope, weakness, light-headedness and headaches.   Hematological: Negative for adenopathy. Does not bruise/bleed easily.   Psychiatric/Behavioral: Negative for behavioral problems and confusion.   All other systems reviewed and are negative.      Past Medical History:   Diagnosis Date   • Conjunctivitis      BILATERAL     • Encounter for insertion of intrauterine contraceptive device    • Encounter for  visit      visit status    • IUD check up    • Pain in pelvis    • Urinary tract infectious disease    • Vaginal discharge     Fishy vaginal discharge         Allergies   Allergen Reactions   • Amoxicillin Rash    • Biaxin [Clarithromycin] Rash   • Ceclor [Cefaclor] Rash   • Penicillins Rash       Past Surgical History:   Procedure Laterality Date   •  SECTION  2014    Primary low transverse  section. Intrauterine pregnancy at 40 5/7 weeks gestation. Induction of labor secondary to postdates. Patient requests  section   • INJECTION OF MEDICATION  2016    Marta (1)  Elizabeth Palacios       Family History   Problem Relation Age of Onset   • Anxiety disorder Mother    • Kidney nephrosis Mother    • Heart disease Father         Leaky heart valve   • Hyperlipidemia Father         Hypercholesterolemia   • Sleep disorder Father    • Breast cancer Other    • Depression Other    • Hypertension Other    • Diabetes Other    • Cancer Other        Social History     Socioeconomic History   • Marital status: Single   Tobacco Use   • Smoking status: Never Smoker   • Smokeless tobacco: Never Used   Substance and Sexual Activity   • Alcohol use: No   • Drug use: No   • Sexual activity: Defer           Objective   Physical Exam  Vitals and nursing note reviewed.   Constitutional:       Appearance: She is well-developed.   HENT:      Head: Normocephalic and atraumatic.      Nose: Nose normal.   Eyes:      General: No scleral icterus.        Right eye: No discharge.         Left eye: No discharge.      Conjunctiva/sclera: Conjunctivae normal.      Pupils: Pupils are equal, round, and reactive to light.   Neck:      Trachea: No tracheal deviation.   Cardiovascular:      Rate and Rhythm: Normal rate and regular rhythm.      Heart sounds: Normal heart sounds. No murmur heard.  Pulmonary:      Effort: Pulmonary effort is normal. No respiratory distress.      Breath sounds: Normal breath sounds. No stridor. No wheezing or rales.   Abdominal:      General: Bowel sounds are normal. There is no distension.      Palpations: Abdomen is soft. There is no mass.      Tenderness: There is no abdominal tenderness. There  is right CVA tenderness. There is no guarding or rebound.   Musculoskeletal:      Cervical back: Normal range of motion and neck supple.   Skin:     General: Skin is warm and dry.      Findings: No erythema or rash.   Neurological:      Mental Status: She is alert and oriented to person, place, and time.      Coordination: Coordination normal.   Psychiatric:         Behavior: Behavior normal.         Thought Content: Thought content normal.         Procedures           ED Course                   Labs Reviewed   COMPREHENSIVE METABOLIC PANEL - Abnormal; Notable for the following components:       Result Value    Glucose 103 (*)     ALT (SGPT) 85 (*)     AST (SGOT) 63 (*)     All other components within normal limits    Narrative:     GFR Normal >60  Chronic Kidney Disease <60  Kidney Failure <15     URINALYSIS W/ MICROSCOPIC IF INDICATED (NO CULTURE) - Abnormal; Notable for the following components:    Blood, UA Trace (*)     Leuk Esterase, UA Trace (*)     All other components within normal limits   CBC WITH AUTO DIFFERENTIAL - Abnormal; Notable for the following components:    RDW 11.5 (*)     RDW-SD 36.5 (*)     All other components within normal limits   URINALYSIS, MICROSCOPIC ONLY - Abnormal; Notable for the following components:    RBC, UA 0-2 (*)     Bacteria, UA Trace (*)     Squamous Epithelial Cells, UA 3-5 (*)     All other components within normal limits   LIPASE - Normal   HCG, SERUM, QUALITATIVE - Normal   URINE CULTURE   RAINBOW DRAW    Narrative:     The following orders were created for panel order [place] Draw.  Procedure                               Abnormality         Status                     ---------                               -----------         ------                     Green Top (Gel)[[id number]]                                  Final result               Lavender Top[[id number]]                                     Final result               Gold Top - SST[[id number]]                                                               Light Blue Top[[id number]]                                   Final result                 Please view results for these tests on the individual orders.   CBC AND DIFFERENTIAL    Narrative:     The following orders were created for panel order CBC & Differential.  Procedure                               Abnormality         Status                     ---------                               -----------         ------                     CBC Auto Differential[[id number]]        Abnormal            Final result                 Please view results for these tests on the individual orders.   GREEN TOP   LAVENDER TOP   LIGHT BLUE TOP       CT Abdomen Pelvis Without Contrast   Final Result   Impression:     1. 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 There is mild   fullness of the proximal ureters without obstructing calculus.    2. There is a nonobstructing right renal calculus.   3. Urinary bladder is underdistended.   4. Moderate to large retained stool throughout the colon.               Electronically signed by:  David Dobbins MD  4/19/2022 8:49 AM CDT   Workstation: [id number]                                             [facility]    Final diagnoses:   Acute right flank pain   Kidney stone       ED Disposition  ED Disposition     ED Disposition   Discharge    Condition   Stable    Comment   --             Chasity Briggs, DO  [address]  [facility] [address] [phone]    In 3 days           Medication List      New Prescriptions    meloxicam 15 MG tablet  Commonly known as: MOBIC  Take 1 tablet by mouth Daily.     ondansetron ODT 4 MG disintegrating tablet  Commonly known as: ZOFRAN-ODT  Place 1 tablet on the tongue Every 6 (Six) Hours As Needed for Nausea or Vomiting.           Where to Get Your Medications      These medications were sent to [facility]/pharmacy #[id number] - [place], [place] - [facility] [phone] PH  - [phone]  [address] [place], [place] [address]    Phone: [phone]   · meloxicam 15 MG tablet  · ondansetron ODT 4 MG disintegrating tablet          Dawson Sinha MD  04/28/22 0742

## 2022-04-20 LAB — BACTERIA SPEC AEROBE CULT: NORMAL

## 2022-06-13 ENCOUNTER — OFFICE VISIT (OUTPATIENT)
Dept: OBSTETRICS AND GYNECOLOGY | Facility: CLINIC | Age: 27
End: 2022-06-13

## 2022-06-13 ENCOUNTER — LAB (OUTPATIENT)
Dept: LAB | Facility: HOSPITAL | Age: 27
End: 2022-06-13

## 2022-06-13 VITALS
HEIGHT: 60 IN | SYSTOLIC BLOOD PRESSURE: 116 MMHG | DIASTOLIC BLOOD PRESSURE: 62 MMHG | BODY MASS INDEX: 35.34 KG/M2 | WEIGHT: 180 LBS

## 2022-06-13 DIAGNOSIS — Z11.3 SCREENING EXAMINATION FOR STD (SEXUALLY TRANSMITTED DISEASE): ICD-10-CM

## 2022-06-13 DIAGNOSIS — Z01.419 ENCOUNTER FOR WELL WOMAN EXAM WITH ROUTINE GYNECOLOGICAL EXAM: Primary | ICD-10-CM

## 2022-06-13 LAB
HCV AB SER DONR QL: NORMAL
HIV1+2 AB SER QL: NORMAL

## 2022-06-13 PROCEDURE — 86803 HEPATITIS C AB TEST: CPT | Performed by: NURSE PRACTITIONER

## 2022-06-13 PROCEDURE — 86592 SYPHILIS TEST NON-TREP QUAL: CPT | Performed by: NURSE PRACTITIONER

## 2022-06-13 PROCEDURE — 87491 CHLMYD TRACH DNA AMP PROBE: CPT | Performed by: NURSE PRACTITIONER

## 2022-06-13 PROCEDURE — 99395 PREV VISIT EST AGE 18-39: CPT | Performed by: NURSE PRACTITIONER

## 2022-06-13 PROCEDURE — 87661 TRICHOMONAS VAGINALIS AMPLIF: CPT | Performed by: NURSE PRACTITIONER

## 2022-06-13 PROCEDURE — 36415 COLL VENOUS BLD VENIPUNCTURE: CPT | Performed by: NURSE PRACTITIONER

## 2022-06-13 PROCEDURE — 87591 N.GONORRHOEAE DNA AMP PROB: CPT | Performed by: NURSE PRACTITIONER

## 2022-06-13 PROCEDURE — G0432 EIA HIV-1/HIV-2 SCREEN: HCPCS | Performed by: NURSE PRACTITIONER

## 2022-06-14 LAB
C TRACH RRNA CVX QL NAA+PROBE: POSITIVE
N GONORRHOEA RRNA SPEC QL NAA+PROBE: NEGATIVE
RPR SER QL: NORMAL
TRICHOMONAS VAGINALIS PCR: NEGATIVE

## 2022-06-15 RX ORDER — FLUCONAZOLE 150 MG/1
TABLET ORAL
Qty: 2 TABLET | Refills: 0 | Status: SHIPPED | OUTPATIENT
Start: 2022-06-15 | End: 2023-02-19

## 2022-06-15 RX ORDER — DOXYCYCLINE HYCLATE 100 MG/1
100 CAPSULE ORAL 2 TIMES DAILY
Qty: 14 CAPSULE | Refills: 0 | Status: SHIPPED | OUTPATIENT
Start: 2022-06-15 | End: 2022-06-22

## 2022-06-15 NOTE — PROGRESS NOTES
Subjective   Avis Ziegler is a 26 y.o. Annual gynecological exam     LMP: 06/06/2022  Pap: ASCUS, negative hrHPV 2019  BC: Mirena placed 04/2019    Pt presents for annual gynecological exam with desire for STD screening.      Gynecologic Exam  The patient's pertinent negatives include no genital itching, genital lesions, genital odor, genital rash, missed menses, pelvic pain, vaginal bleeding or vaginal discharge. The patient is experiencing no pain. She is not pregnant. Pertinent negatives include no abdominal pain, chills, constipation, diarrhea, dysuria, fever, flank pain, frequency, headaches, hematuria, rash or urgency. She is sexually active. No, her partner does not have an STD. She uses an IUD for contraception.       The following portions of the patient's history were reviewed and updated as appropriate: allergies, current medications, past family history, past medical history, past social history, past surgical history and problem list.    Review of Systems   Constitutional: Negative for chills, diaphoresis, fatigue, fever and unexpected weight change.   Respiratory: Negative for apnea, chest tightness and shortness of breath.    Cardiovascular: Negative for chest pain, palpitations and leg swelling.   Gastrointestinal: Negative for abdominal distention, abdominal pain, constipation and diarrhea.   Genitourinary: Negative for decreased urine volume, difficulty urinating, dysuria, enuresis, flank pain, frequency, genital sores, hematuria, missed menses, pelvic pain, urgency, vaginal bleeding, vaginal discharge and vaginal pain.   Skin: Negative for rash.   Neurological: Negative for headaches.   Psychiatric/Behavioral: Negative for sleep disturbance and suicidal ideas.         Objective   Physical Exam  Vitals and nursing note reviewed. Exam conducted with a chaperone present.   Constitutional:       General: She is awake. She is not in acute distress.     Appearance: Normal appearance. She is  well-developed and well-groomed. She is not ill-appearing, toxic-appearing or diaphoretic.   Cardiovascular:      Rate and Rhythm: Normal rate and regular rhythm.      Heart sounds: Normal heart sounds.   Pulmonary:      Effort: Pulmonary effort is normal.      Breath sounds: Normal breath sounds.   Chest:      Comments: Pt declines breast exam   Abdominal:      General: Bowel sounds are normal.      Palpations: Abdomen is soft.      Tenderness: There is no abdominal tenderness.   Genitourinary:     General: Normal vulva.      Exam position: Lithotomy position.      Labia:         Right: No rash, tenderness, lesion or injury.         Left: No rash, tenderness, lesion or injury.       Urethra: No prolapse, urethral pain, urethral swelling or urethral lesion.      Vagina: Normal.      Cervix: Normal.      Uterus: Normal.       Comments: Pap smear obtained, adnexa non-palpable   Skin:     General: Skin is warm and dry.   Neurological:      Mental Status: She is alert and oriented to person, place, and time.   Psychiatric:         Attention and Perception: Attention and perception normal.         Mood and Affect: Mood and affect normal.         Speech: Speech normal.         Behavior: Behavior normal. Behavior is cooperative.           Assessment & Plan   Diagnoses and all orders for this visit:    1. Encounter for well woman exam with routine gynecological exam (Primary)  -     LIQUID-BASED PAP SMEAR, [facility] ([facility])    2. Screening examination for STD (sexually transmitted disease)  -     Chlamydia trachomatis, Neisseria gonorrhoeae, Trichomonas vaginalis, PCR - Urine, Urine, Clean Catch  -     Hepatitis C Antibody  -     HIV-1 & HIV-2 Antibodies  -     RPR      Patient educated and encouraged to do monthly self breast exam. If pap smear is normal patient will receive a letter in the mail in about two weeks.  If pap smear is abnormal we will call patient and follow up with plan.  Next pap due <5 years if normal  today per ASCCP guidelines.

## 2022-06-17 LAB — REF LAB TEST METHOD: NORMAL

## 2023-01-29 ENCOUNTER — APPOINTMENT (OUTPATIENT)
Dept: CT IMAGING | Facility: HOSPITAL | Age: 28
End: 2023-01-29
Payer: COMMERCIAL

## 2023-01-29 ENCOUNTER — HOSPITAL ENCOUNTER (EMERGENCY)
Facility: HOSPITAL | Age: 28
Discharge: HOME | End: 2023-01-29
Attending: FAMILY MEDICINE | Admitting: FAMILY MEDICINE
Payer: COMMERCIAL

## 2023-01-29 VITALS
BODY MASS INDEX: 35.73 KG/M2 | HEART RATE: 76 BPM | WEIGHT: 182 LBS | SYSTOLIC BLOOD PRESSURE: 135 MMHG | HEIGHT: 60 IN | DIASTOLIC BLOOD PRESSURE: 95 MMHG | TEMPERATURE: 98.2 F | OXYGEN SATURATION: 98 % | RESPIRATION RATE: 20 BRPM

## 2023-01-29 DIAGNOSIS — N20.1 URETEROLITHIASIS: Primary | ICD-10-CM

## 2023-01-29 LAB
ALBUMIN SERPL-MCNC: 4.3 G/DL (ref 3.5–5.2)
ALBUMIN/GLOB SERPL: 1.5 G/DL
ALP SERPL-CCNC: 81 U/L (ref 39–117)
ALT SERPL W P-5'-P-CCNC: 18 U/L (ref 1–33)
ANION GAP SERPL CALCULATED.3IONS-SCNC: 11 MMOL/L (ref 5–15)
AST SERPL-CCNC: 25 U/L (ref 1–32)
BACTERIA UR QL AUTO: ABNORMAL /HPF
BASOPHILS # BLD AUTO: 0.03 10*3/MM3 (ref 0–0.2)
BASOPHILS NFR BLD AUTO: 0.3 % (ref 0–1.5)
BILIRUB SERPL-MCNC: 0.2 MG/DL (ref 0–1.2)
BILIRUB UR QL STRIP: NEGATIVE
BUN SERPL-MCNC: 9 MG/DL (ref 6–20)
BUN/CREAT SERPL: 10.7 (ref 7–25)
CALCIUM SPEC-SCNC: 9.2 MG/DL (ref 8.6–10.5)
CHLORIDE SERPL-SCNC: 104 MMOL/L (ref 98–107)
CLARITY UR: CLEAR
CO2 SERPL-SCNC: 23 MMOL/L (ref 22–29)
COLOR UR: YELLOW
CREAT SERPL-MCNC: 0.84 MG/DL (ref 0.57–1)
DEPRECATED RDW RBC AUTO: 35.6 FL (ref 37–54)
EGFRCR SERPLBLD CKD-EPI 2021: 97.2 ML/MIN/1.73
EOSINOPHIL # BLD AUTO: 0.11 10*3/MM3 (ref 0–0.4)
EOSINOPHIL NFR BLD AUTO: 1.1 % (ref 0.3–6.2)
ERYTHROCYTE [DISTWIDTH] IN BLOOD BY AUTOMATED COUNT: 11.6 % (ref 12.3–15.4)
GLOBULIN UR ELPH-MCNC: 2.9 GM/DL
GLUCOSE SERPL-MCNC: 97 MG/DL (ref 65–99)
GLUCOSE UR STRIP-MCNC: NEGATIVE MG/DL
HCG SERPL QL: NEGATIVE
HCT VFR BLD AUTO: 37.6 % (ref 34–46.6)
HGB BLD-MCNC: 13.1 G/DL (ref 12–15.9)
HGB UR QL STRIP.AUTO: ABNORMAL
HOLD SPECIMEN: NORMAL
HOLD SPECIMEN: NORMAL
HYALINE CASTS UR QL AUTO: ABNORMAL /LPF
IMM GRANULOCYTES # BLD AUTO: 0.02 10*3/MM3 (ref 0–0.05)
IMM GRANULOCYTES NFR BLD AUTO: 0.2 % (ref 0–0.5)
KETONES UR QL STRIP: NEGATIVE
LEUKOCYTE ESTERASE UR QL STRIP.AUTO: ABNORMAL
LIPASE SERPL-CCNC: 15 U/L (ref 13–60)
LYMPHOCYTES # BLD AUTO: 2.76 10*3/MM3 (ref 0.7–3.1)
LYMPHOCYTES NFR BLD AUTO: 27.4 % (ref 19.6–45.3)
MCH RBC QN AUTO: 29.6 PG (ref 26.6–33)
MCHC RBC AUTO-ENTMCNC: 34.8 G/DL (ref 31.5–35.7)
MCV RBC AUTO: 85.1 FL (ref 79–97)
MONOCYTES # BLD AUTO: 0.68 10*3/MM3 (ref 0.1–0.9)
MONOCYTES NFR BLD AUTO: 6.8 % (ref 5–12)
NEUTROPHILS NFR BLD AUTO: 6.46 10*3/MM3 (ref 1.7–7)
NEUTROPHILS NFR BLD AUTO: 64.2 % (ref 42.7–76)
NITRITE UR QL STRIP: NEGATIVE
NRBC BLD AUTO-RTO: 0 /100 WBC (ref 0–0.2)
PH UR STRIP.AUTO: 6 [PH] (ref 5–9)
PLATELET # BLD AUTO: 233 10*3/MM3 (ref 140–450)
PMV BLD AUTO: 9.1 FL (ref 6–12)
POTASSIUM SERPL-SCNC: 3.6 MMOL/L (ref 3.5–5.2)
PROT SERPL-MCNC: 7.2 G/DL (ref 6–8.5)
PROT UR QL STRIP: ABNORMAL
RBC # BLD AUTO: 4.42 10*6/MM3 (ref 3.77–5.28)
RBC # UR STRIP: ABNORMAL /HPF
REF LAB TEST METHOD: ABNORMAL
SODIUM SERPL-SCNC: 138 MMOL/L (ref 136–145)
SP GR UR STRIP: 1.01 (ref 1–1.03)
SQUAMOUS #/AREA URNS HPF: ABNORMAL /HPF
UROBILINOGEN UR QL STRIP: ABNORMAL
WBC # UR STRIP: ABNORMAL /HPF
WBC NRBC COR # BLD: 10.06 10*3/MM3 (ref 3.4–10.8)
WHOLE BLOOD HOLD COAG: NORMAL
WHOLE BLOOD HOLD SPECIMEN: NORMAL

## 2023-01-29 PROCEDURE — 96374 THER/PROPH/DIAG INJ IV PUSH: CPT

## 2023-01-29 PROCEDURE — 25010000002 ONDANSETRON PER 1 MG: Performed by: FAMILY MEDICINE

## 2023-01-29 PROCEDURE — 25010000002 MORPHINE PER 10 MG: Performed by: FAMILY MEDICINE

## 2023-01-29 PROCEDURE — 25010000002 KETOROLAC TROMETHAMINE PER 15 MG: Performed by: FAMILY MEDICINE

## 2023-01-29 PROCEDURE — 85025 COMPLETE CBC W/AUTO DIFF WBC: CPT

## 2023-01-29 PROCEDURE — 25010000002 METOCLOPRAMIDE PER 10 MG: Performed by: FAMILY MEDICINE

## 2023-01-29 PROCEDURE — 96376 TX/PRO/DX INJ SAME DRUG ADON: CPT

## 2023-01-29 PROCEDURE — 96375 TX/PRO/DX INJ NEW DRUG ADDON: CPT

## 2023-01-29 PROCEDURE — 83690 ASSAY OF LIPASE: CPT

## 2023-01-29 PROCEDURE — 80053 COMPREHEN METABOLIC PANEL: CPT | Performed by: FAMILY MEDICINE

## 2023-01-29 PROCEDURE — 74176 CT ABD & PELVIS W/O CONTRAST: CPT

## 2023-01-29 PROCEDURE — 81001 URINALYSIS AUTO W/SCOPE: CPT | Performed by: FAMILY MEDICINE

## 2023-01-29 PROCEDURE — 84703 CHORIONIC GONADOTROPIN ASSAY: CPT | Performed by: FAMILY MEDICINE

## 2023-01-29 PROCEDURE — 99283 EMERGENCY DEPT VISIT LOW MDM: CPT

## 2023-01-29 RX ORDER — TAMSULOSIN HYDROCHLORIDE 0.4 MG/1
1 CAPSULE ORAL NIGHTLY
Qty: 5 CAPSULE | Refills: 0 | Status: SHIPPED | OUTPATIENT
Start: 2023-01-29 | End: 2023-02-19

## 2023-01-29 RX ORDER — HYDROCODONE BITARTRATE AND ACETAMINOPHEN 7.5; 325 MG/1; MG/1
1 TABLET ORAL EVERY 6 HOURS PRN
Qty: 12 TABLET | Refills: 0 | Status: SHIPPED | OUTPATIENT
Start: 2023-01-29 | End: 2023-02-04 | Stop reason: HOSPADM

## 2023-01-29 RX ORDER — METOCLOPRAMIDE HYDROCHLORIDE 5 MG/ML
10 INJECTION INTRAMUSCULAR; INTRAVENOUS ONCE
Status: COMPLETED | OUTPATIENT
Start: 2023-01-29 | End: 2023-01-29

## 2023-01-29 RX ORDER — SODIUM CHLORIDE 0.9 % (FLUSH) 0.9 %
10 SYRINGE (ML) INJECTION AS NEEDED
Status: DISCONTINUED | OUTPATIENT
Start: 2023-01-29 | End: 2023-01-30 | Stop reason: HOSPADM

## 2023-01-29 RX ORDER — KETOROLAC TROMETHAMINE 15 MG/ML
15 INJECTION, SOLUTION INTRAMUSCULAR; INTRAVENOUS ONCE
Status: COMPLETED | OUTPATIENT
Start: 2023-01-29 | End: 2023-01-29

## 2023-01-29 RX ORDER — BIOTIN 1000 MCG
25000 TABLET,CHEWABLE ORAL DAILY
COMMUNITY
End: 2023-02-19

## 2023-01-29 RX ORDER — TAMSULOSIN HYDROCHLORIDE 0.4 MG/1
0.4 CAPSULE ORAL ONCE
Status: COMPLETED | OUTPATIENT
Start: 2023-01-29 | End: 2023-01-29

## 2023-01-29 RX ORDER — ONDANSETRON 4 MG/1
4 TABLET, ORALLY DISINTEGRATING ORAL EVERY 6 HOURS PRN
Qty: 10 TABLET | Refills: 0 | Status: SHIPPED | OUTPATIENT
Start: 2023-01-29 | End: 2023-02-19

## 2023-01-29 RX ORDER — ONDANSETRON 2 MG/ML
4 INJECTION INTRAMUSCULAR; INTRAVENOUS ONCE
Status: COMPLETED | OUTPATIENT
Start: 2023-01-29 | End: 2023-01-29

## 2023-01-29 RX ADMIN — MORPHINE SULFATE 4 MG: 4 INJECTION, SOLUTION INTRAMUSCULAR; INTRAVENOUS at 21:35

## 2023-01-29 RX ADMIN — ONDANSETRON 4 MG: 2 INJECTION INTRAMUSCULAR; INTRAVENOUS at 19:41

## 2023-01-29 RX ADMIN — METOCLOPRAMIDE 10 MG: 5 INJECTION, SOLUTION INTRAMUSCULAR; INTRAVENOUS at 20:48

## 2023-01-29 RX ADMIN — KETOROLAC TROMETHAMINE 15 MG: 15 INJECTION, SOLUTION INTRAMUSCULAR; INTRAVENOUS at 21:35

## 2023-01-29 RX ADMIN — MORPHINE SULFATE 4 MG: 4 INJECTION, SOLUTION INTRAMUSCULAR; INTRAVENOUS at 19:41

## 2023-01-29 RX ADMIN — TAMSULOSIN HYDROCHLORIDE 0.4 MG: 0.4 CAPSULE ORAL at 22:07

## 2023-02-02 ENCOUNTER — APPOINTMENT (OUTPATIENT)
Dept: CT IMAGING | Facility: HOSPITAL | Age: 28
End: 2023-02-02
Payer: COMMERCIAL

## 2023-02-02 ENCOUNTER — HOSPITAL ENCOUNTER (OUTPATIENT)
Facility: HOSPITAL | Age: 28
Discharge: HOME | End: 2023-02-04
Attending: STUDENT IN AN ORGANIZED HEALTH CARE EDUCATION/TRAINING PROGRAM | Admitting: UROLOGY
Payer: COMMERCIAL

## 2023-02-02 DIAGNOSIS — N20.1 URETEROLITHIASIS: ICD-10-CM

## 2023-02-02 DIAGNOSIS — N13.30 HYDRONEPHROSIS, UNSPECIFIED HYDRONEPHROSIS TYPE: Primary | ICD-10-CM

## 2023-02-02 LAB
ALBUMIN SERPL-MCNC: 4.4 G/DL (ref 3.5–5.2)
ALBUMIN/GLOB SERPL: 1.6 G/DL
ALP SERPL-CCNC: 81 U/L (ref 39–117)
ALT SERPL W P-5'-P-CCNC: 15 U/L (ref 1–33)
ANION GAP SERPL CALCULATED.3IONS-SCNC: 8 MMOL/L (ref 5–15)
AST SERPL-CCNC: 18 U/L (ref 1–32)
BACTERIA UR QL AUTO: ABNORMAL /HPF
BASOPHILS # BLD AUTO: 0.03 10*3/MM3 (ref 0–0.2)
BASOPHILS NFR BLD AUTO: 0.3 % (ref 0–1.5)
BILIRUB SERPL-MCNC: 0.3 MG/DL (ref 0–1.2)
BILIRUB UR QL STRIP: NEGATIVE
BUN SERPL-MCNC: 10 MG/DL (ref 6–20)
BUN/CREAT SERPL: 10.6 (ref 7–25)
CALCIUM SPEC-SCNC: 9.1 MG/DL (ref 8.6–10.5)
CHLORIDE SERPL-SCNC: 104 MMOL/L (ref 98–107)
CLARITY UR: ABNORMAL
CO2 SERPL-SCNC: 27 MMOL/L (ref 22–29)
COLOR UR: YELLOW
CREAT SERPL-MCNC: 0.94 MG/DL (ref 0.57–1)
D-LACTATE SERPL-SCNC: 1 MMOL/L (ref 0.5–2)
DEPRECATED RDW RBC AUTO: 36.5 FL (ref 37–54)
EGFRCR SERPLBLD CKD-EPI 2021: 84.9 ML/MIN/1.73
EOSINOPHIL # BLD AUTO: 0.07 10*3/MM3 (ref 0–0.4)
EOSINOPHIL NFR BLD AUTO: 0.7 % (ref 0.3–6.2)
ERYTHROCYTE [DISTWIDTH] IN BLOOD BY AUTOMATED COUNT: 11.5 % (ref 12.3–15.4)
GLOBULIN UR ELPH-MCNC: 2.7 GM/DL
GLUCOSE SERPL-MCNC: 109 MG/DL (ref 65–99)
GLUCOSE UR STRIP-MCNC: NEGATIVE MG/DL
HCG SERPL QL: NEGATIVE
HCT VFR BLD AUTO: 37.4 % (ref 34–46.6)
HGB BLD-MCNC: 12.9 G/DL (ref 12–15.9)
HGB UR QL STRIP.AUTO: ABNORMAL
HOLD SPECIMEN: NORMAL
HYALINE CASTS UR QL AUTO: ABNORMAL /LPF
IMM GRANULOCYTES # BLD AUTO: 0.03 10*3/MM3 (ref 0–0.05)
IMM GRANULOCYTES NFR BLD AUTO: 0.3 % (ref 0–0.5)
KETONES UR QL STRIP: NEGATIVE
LEUKOCYTE ESTERASE UR QL STRIP.AUTO: NEGATIVE
LIPASE SERPL-CCNC: 13 U/L (ref 13–60)
LYMPHOCYTES # BLD AUTO: 1.61 10*3/MM3 (ref 0.7–3.1)
LYMPHOCYTES NFR BLD AUTO: 15 % (ref 19.6–45.3)
MCH RBC QN AUTO: 29.9 PG (ref 26.6–33)
MCHC RBC AUTO-ENTMCNC: 34.5 G/DL (ref 31.5–35.7)
MCV RBC AUTO: 86.6 FL (ref 79–97)
MONOCYTES # BLD AUTO: 0.61 10*3/MM3 (ref 0.1–0.9)
MONOCYTES NFR BLD AUTO: 5.7 % (ref 5–12)
NEUTROPHILS NFR BLD AUTO: 78 % (ref 42.7–76)
NEUTROPHILS NFR BLD AUTO: 8.38 10*3/MM3 (ref 1.7–7)
NITRITE UR QL STRIP: NEGATIVE
NRBC BLD AUTO-RTO: 0 /100 WBC (ref 0–0.2)
PH UR STRIP.AUTO: <=5 [PH] (ref 5–9)
PLATELET # BLD AUTO: 222 10*3/MM3 (ref 140–450)
PMV BLD AUTO: 9.1 FL (ref 6–12)
POTASSIUM SERPL-SCNC: 3.4 MMOL/L (ref 3.5–5.2)
PROCALCITONIN SERPL-MCNC: 0.04 NG/ML (ref 0–0.25)
PROT SERPL-MCNC: 7.1 G/DL (ref 6–8.5)
PROT UR QL STRIP: ABNORMAL
RBC # BLD AUTO: 4.32 10*6/MM3 (ref 3.77–5.28)
RBC # UR STRIP: ABNORMAL /HPF
REF LAB TEST METHOD: ABNORMAL
SODIUM SERPL-SCNC: 139 MMOL/L (ref 136–145)
SP GR UR STRIP: 1.03 (ref 1–1.03)
SQUAMOUS #/AREA URNS HPF: ABNORMAL /HPF
UROBILINOGEN UR QL STRIP: ABNORMAL
WBC # UR STRIP: ABNORMAL /HPF
WBC NRBC COR # BLD: 10.73 10*3/MM3 (ref 3.4–10.8)
WHOLE BLOOD HOLD COAG: NORMAL
WHOLE BLOOD HOLD SPECIMEN: NORMAL

## 2023-02-02 PROCEDURE — 87040 BLOOD CULTURE FOR BACTERIA: CPT

## 2023-02-02 PROCEDURE — 87086 URINE CULTURE/COLONY COUNT: CPT

## 2023-02-02 PROCEDURE — 25010000002 SODIUM CHLORIDE 0.9 % WITH KCL 20 MEQ 20-0.9 MEQ/L-% SOLUTION: Performed by: INTERNAL MEDICINE

## 2023-02-02 PROCEDURE — 81001 URINALYSIS AUTO W/SCOPE: CPT

## 2023-02-02 PROCEDURE — 84145 PROCALCITONIN (PCT): CPT

## 2023-02-02 PROCEDURE — 84703 CHORIONIC GONADOTROPIN ASSAY: CPT

## 2023-02-02 PROCEDURE — 74176 CT ABD & PELVIS W/O CONTRAST: CPT

## 2023-02-02 PROCEDURE — G0378 HOSPITAL OBSERVATION PER HR: HCPCS

## 2023-02-02 PROCEDURE — 96375 TX/PRO/DX INJ NEW DRUG ADDON: CPT

## 2023-02-02 PROCEDURE — 83690 ASSAY OF LIPASE: CPT

## 2023-02-02 PROCEDURE — 80053 COMPREHEN METABOLIC PANEL: CPT

## 2023-02-02 PROCEDURE — 99285 EMERGENCY DEPT VISIT HI MDM: CPT

## 2023-02-02 PROCEDURE — 93010 ELECTROCARDIOGRAM REPORT: CPT | Performed by: INTERNAL MEDICINE

## 2023-02-02 PROCEDURE — 25010000002 ONDANSETRON PER 1 MG

## 2023-02-02 PROCEDURE — 83605 ASSAY OF LACTIC ACID: CPT

## 2023-02-02 PROCEDURE — 36415 COLL VENOUS BLD VENIPUNCTURE: CPT

## 2023-02-02 PROCEDURE — 25010000002 LEVOFLOXACIN PER 250 MG: Performed by: INTERNAL MEDICINE

## 2023-02-02 PROCEDURE — 25010000002 KETOROLAC TROMETHAMINE PER 15 MG

## 2023-02-02 PROCEDURE — 85025 COMPLETE CBC W/AUTO DIFF WBC: CPT

## 2023-02-02 PROCEDURE — 93005 ELECTROCARDIOGRAM TRACING: CPT | Performed by: INTERNAL MEDICINE

## 2023-02-02 PROCEDURE — 96374 THER/PROPH/DIAG INJ IV PUSH: CPT

## 2023-02-02 RX ORDER — TAMSULOSIN HYDROCHLORIDE 0.4 MG/1
0.4 CAPSULE ORAL NIGHTLY
Status: DISCONTINUED | OUTPATIENT
Start: 2023-02-02 | End: 2023-02-04 | Stop reason: HOSPADM

## 2023-02-02 RX ORDER — POTASSIUM CHLORIDE 750 MG/1
20 CAPSULE, EXTENDED RELEASE ORAL ONCE
Status: COMPLETED | OUTPATIENT
Start: 2023-02-02 | End: 2023-02-02

## 2023-02-02 RX ORDER — LEVOFLOXACIN 5 MG/ML
750 INJECTION, SOLUTION INTRAVENOUS EVERY 24 HOURS
Status: DISCONTINUED | OUTPATIENT
Start: 2023-02-02 | End: 2023-02-03

## 2023-02-02 RX ORDER — SODIUM CHLORIDE 0.9 % (FLUSH) 0.9 %
10 SYRINGE (ML) INJECTION EVERY 12 HOURS SCHEDULED
Status: DISCONTINUED | OUTPATIENT
Start: 2023-02-02 | End: 2023-02-04 | Stop reason: HOSPADM

## 2023-02-02 RX ORDER — POTASSIUM CHLORIDE 1.5 G/1.77G
20 POWDER, FOR SOLUTION ORAL ONCE
Status: COMPLETED | OUTPATIENT
Start: 2023-02-02 | End: 2023-02-02

## 2023-02-02 RX ORDER — BUPROPION HYDROCHLORIDE 150 MG/1
150 TABLET ORAL EVERY MORNING
Status: DISCONTINUED | OUTPATIENT
Start: 2023-02-03 | End: 2023-02-04 | Stop reason: HOSPADM

## 2023-02-02 RX ORDER — MORPHINE SULFATE 2 MG/ML
2 INJECTION, SOLUTION INTRAMUSCULAR; INTRAVENOUS EVERY 4 HOURS PRN
Status: DISCONTINUED | OUTPATIENT
Start: 2023-02-02 | End: 2023-02-03

## 2023-02-02 RX ORDER — HYDROCODONE BITARTRATE AND ACETAMINOPHEN 7.5; 325 MG/1; MG/1
1 TABLET ORAL EVERY 6 HOURS PRN
Status: DISCONTINUED | OUTPATIENT
Start: 2023-02-02 | End: 2023-02-04 | Stop reason: HOSPADM

## 2023-02-02 RX ORDER — ONDANSETRON 2 MG/ML
4 INJECTION INTRAMUSCULAR; INTRAVENOUS ONCE
Status: COMPLETED | OUTPATIENT
Start: 2023-02-02 | End: 2023-02-02

## 2023-02-02 RX ORDER — SODIUM CHLORIDE 0.9 % (FLUSH) 0.9 %
10 SYRINGE (ML) INJECTION AS NEEDED
Status: DISCONTINUED | OUTPATIENT
Start: 2023-02-02 | End: 2023-02-04 | Stop reason: HOSPADM

## 2023-02-02 RX ORDER — KETOROLAC TROMETHAMINE 30 MG/ML
30 INJECTION, SOLUTION INTRAMUSCULAR; INTRAVENOUS ONCE
Status: COMPLETED | OUTPATIENT
Start: 2023-02-02 | End: 2023-02-02

## 2023-02-02 RX ORDER — LEVOFLOXACIN 5 MG/ML
750 INJECTION, SOLUTION INTRAVENOUS ONCE
Status: DISCONTINUED | OUTPATIENT
Start: 2023-02-02 | End: 2023-02-02 | Stop reason: SDUPTHER

## 2023-02-02 RX ORDER — SODIUM CHLORIDE 9 MG/ML
40 INJECTION, SOLUTION INTRAVENOUS AS NEEDED
Status: DISCONTINUED | OUTPATIENT
Start: 2023-02-02 | End: 2023-02-04 | Stop reason: HOSPADM

## 2023-02-02 RX ORDER — SODIUM CHLORIDE AND POTASSIUM CHLORIDE 150; 900 MG/100ML; MG/100ML
75 INJECTION, SOLUTION INTRAVENOUS CONTINUOUS
Status: DISCONTINUED | OUTPATIENT
Start: 2023-02-02 | End: 2023-02-03

## 2023-02-02 RX ADMIN — POTASSIUM CHLORIDE AND SODIUM CHLORIDE 75 ML/HR: 900; 150 INJECTION, SOLUTION INTRAVENOUS at 22:02

## 2023-02-02 RX ADMIN — KETOROLAC TROMETHAMINE 30 MG: 30 INJECTION, SOLUTION INTRAMUSCULAR; INTRAVENOUS at 18:11

## 2023-02-02 RX ADMIN — LEVOFLOXACIN 750 MG: 5 INJECTION, SOLUTION INTRAVENOUS at 22:06

## 2023-02-02 RX ADMIN — ONDANSETRON 4 MG: 2 INJECTION INTRAMUSCULAR; INTRAVENOUS at 18:11

## 2023-02-02 RX ADMIN — HYDROCODONE BITARTRATE AND ACETAMINOPHEN 1 TABLET: 7.5; 325 TABLET ORAL at 23:48

## 2023-02-02 RX ADMIN — SODIUM CHLORIDE 1000 ML: 9 INJECTION, SOLUTION INTRAVENOUS at 18:10

## 2023-02-02 RX ADMIN — POTASSIUM CHLORIDE 20 MEQ: 750 CAPSULE, EXTENDED RELEASE ORAL at 19:53

## 2023-02-02 RX ADMIN — POTASSIUM CHLORIDE 20 MEQ: 1.5 POWDER, FOR SOLUTION ORAL at 22:03

## 2023-02-02 RX ADMIN — Medication 10 ML: at 22:00

## 2023-02-02 NOTE — ED NOTES
Patient presents to the ED with c/o right side flank pain. Reports she was dx with kidney stones on Sunday, January 29. Patient reports her pain is worsening.

## 2023-02-02 NOTE — ED PROVIDER NOTES
Subjective   History of Present Illness  27 year old female patient presents to ER for complaint of right flank pain since . She was seen in ER on  and was diagnosed with a kidney stone and was referred to Dr. Kaba. She reports that her pain was improved yesterday, but today is worse and she now has burning with urination. She denies abdominal pain and reports that the pain has not moved since onset. She is rating pain 5/10 presently. Has been taking her flomax and was prescribed lortab. She denies tobacco, ETOH, or illicit drug use. LMP was last week.        Review of Systems   Constitutional: Negative.  Negative for fever.   HENT: Negative.    Eyes: Negative.    Respiratory: Negative.    Cardiovascular: Negative.    Gastrointestinal: Positive for nausea. Negative for abdominal pain, diarrhea and vomiting.   Endocrine: Negative.    Genitourinary: Positive for dysuria and flank pain. Negative for difficulty urinating.   Skin: Negative.    Allergic/Immunologic: Negative.    Neurological: Negative.    Hematological: Negative.    Psychiatric/Behavioral: Negative.        Past Medical History:   Diagnosis Date   • Conjunctivitis      BILATERAL     • Encounter for insertion of intrauterine contraceptive device    • Encounter for  visit      visit status    • IUD check up    • Pain in pelvis    • Urinary tract infectious disease    • Vaginal discharge     Fishy vaginal discharge         Allergies   Allergen Reactions   • Amoxicillin Rash   • Biaxin [Clarithromycin] Rash   • Ceclor [Cefaclor] Rash   • Penicillins Rash       Past Surgical History:   Procedure Laterality Date   •  SECTION  2014    Primary low transverse  section. Intrauterine pregnancy at 40 5/7 weeks gestation. Induction of labor secondary to postdates. Patient requests  section   • INJECTION OF MEDICATION  2016    Marta (1)  Elizabeth Palacios       Family History   Problem Relation Age of  "Onset   • Anxiety disorder Mother    • Kidney nephrosis Mother    • Heart disease Father         Leaky heart valve   • Hyperlipidemia Father         Hypercholesterolemia   • Sleep disorder Father    • Breast cancer Other    • Depression Other    • Hypertension Other    • Diabetes Other    • Cancer Other        Social History     Socioeconomic History   • Marital status: Single   Tobacco Use   • Smoking status: Never   • Smokeless tobacco: Never   Substance and Sexual Activity   • Alcohol use: No   • Drug use: No   • Sexual activity: Defer           Objective    /83 (BP Location: Right arm, Patient Position: Sitting)   Pulse 69   Temp 97.7 °F (36.5 °C) (Oral)   Resp 20   Ht 152.4 cm (60\")   Wt 82.6 kg (182 lb)   LMP 01/26/2023 (Approximate)   SpO2 100%   BMI 35.54 kg/m²     Physical Exam  Vitals and nursing note reviewed.   Constitutional:       General: She is not in acute distress.     Appearance: Normal appearance. She is not ill-appearing, toxic-appearing or diaphoretic.   HENT:      Head: Normocephalic.      Nose: Nose normal.      Mouth/Throat:      Mouth: Mucous membranes are moist.   Eyes:      Pupils: Pupils are equal, round, and reactive to light.   Cardiovascular:      Rate and Rhythm: Normal rate and regular rhythm.      Pulses: Normal pulses.      Heart sounds: Normal heart sounds.   Pulmonary:      Effort: Pulmonary effort is normal. No respiratory distress.      Breath sounds: Normal breath sounds. No stridor. No wheezing or rhonchi.   Abdominal:      General: Bowel sounds are normal. There is no distension.      Palpations: Abdomen is soft.      Tenderness: There is no abdominal tenderness. There is right CVA tenderness. There is no left CVA tenderness.   Musculoskeletal:         General: Normal range of motion.      Cervical back: Normal range of motion.   Skin:     General: Skin is warm and dry.      Capillary Refill: Capillary refill takes less than 2 seconds.   Neurological:      " Mental Status: She is alert and oriented to person, place, and time.   Psychiatric:         Mood and Affect: Mood normal.         Behavior: Behavior normal.         Thought Content: Thought content normal.         Judgment: Judgment normal.         Procedures           ED Course  ED Course as of 02/02/23 1952 Thu Feb 02, 2023 1941 Spoke with Dr. Kaba who is agreeable for admission [HS]   1948 Spoke with Dr. Behroozi who is agreeeable with admission [HS]      ED Course User Index  [HS] Josie Campbell, APRN            Results for orders placed or performed during the hospital encounter of 02/02/23   Comprehensive Metabolic Panel    Specimen: Blood   Result Value Ref Range    Glucose 109 (H) 65 - 99 mg/dL    BUN 10 6 - 20 mg/dL    Creatinine 0.94 0.57 - 1.00 mg/dL    Sodium 139 136 - 145 mmol/L    Potassium 3.4 (L) 3.5 - 5.2 mmol/L    Chloride 104 98 - 107 mmol/L    CO2 27.0 22.0 - 29.0 mmol/L    Calcium 9.1 8.6 - 10.5 mg/dL    Total Protein 7.1 6.0 - 8.5 g/dL    Albumin 4.4 3.5 - 5.2 g/dL    ALT (SGPT) 15 1 - 33 U/L    AST (SGOT) 18 1 - 32 U/L    Alkaline Phosphatase 81 39 - 117 U/L    Total Bilirubin 0.3 0.0 - 1.2 mg/dL    Globulin 2.7 gm/dL    A/G Ratio 1.6 g/dL    BUN/Creatinine Ratio 10.6 7.0 - 25.0    Anion Gap 8.0 5.0 - 15.0 mmol/L    eGFR 84.9 >60.0 mL/min/1.73   Lipase    Specimen: Blood   Result Value Ref Range    Lipase 13 13 - 60 U/L   Urinalysis With Microscopic If Indicated (No Culture) - Urine, Clean Catch    Specimen: Urine, Clean Catch   Result Value Ref Range    Color, UA Yellow Yellow, Straw, Dark Yellow, Tamara    Appearance, UA Cloudy (A) Clear    pH, UA <=5.0 5.0 - 9.0    Specific Gravity, UA 1.029 1.003 - 1.030    Glucose, UA Negative Negative    Ketones, UA Negative Negative    Bilirubin, UA Negative Negative    Blood, UA Large (3+) (A) Negative    Protein, UA Trace (A) Negative    Leuk Esterase, UA Negative Negative    Nitrite, UA Negative Negative    Urobilinogen, UA 0.2 E.U./dL 0.2  - 1.0 E.U./dL   CBC Auto Differential    Specimen: Blood   Result Value Ref Range    WBC 10.73 3.40 - 10.80 10*3/mm3    RBC 4.32 3.77 - 5.28 10*6/mm3    Hemoglobin 12.9 12.0 - 15.9 g/dL    Hematocrit 37.4 34.0 - 46.6 %    MCV 86.6 79.0 - 97.0 fL    MCH 29.9 26.6 - 33.0 pg    MCHC 34.5 31.5 - 35.7 g/dL    RDW 11.5 (L) 12.3 - 15.4 %    RDW-SD 36.5 (L) 37.0 - 54.0 fl    MPV 9.1 6.0 - 12.0 fL    Platelets 222 140 - 450 10*3/mm3    Neutrophil % 78.0 (H) 42.7 - 76.0 %    Lymphocyte % 15.0 (L) 19.6 - 45.3 %    Monocyte % 5.7 5.0 - 12.0 %    Eosinophil % 0.7 0.3 - 6.2 %    Basophil % 0.3 0.0 - 1.5 %    Immature Grans % 0.3 0.0 - 0.5 %    Neutrophils, Absolute 8.38 (H) 1.70 - 7.00 10*3/mm3    Lymphocytes, Absolute 1.61 0.70 - 3.10 10*3/mm3    Monocytes, Absolute 0.61 0.10 - 0.90 10*3/mm3    Eosinophils, Absolute 0.07 0.00 - 0.40 10*3/mm3    Basophils, Absolute 0.03 0.00 - 0.20 10*3/mm3    Immature Grans, Absolute 0.03 0.00 - 0.05 10*3/mm3    nRBC 0.0 0.0 - 0.2 /100 WBC   Urinalysis, Microscopic Only - Urine, Clean Catch    Specimen: Urine, Clean Catch   Result Value Ref Range    RBC, UA 31-50 (A) None Seen /HPF    WBC, UA 6-12 (A) None Seen, 0-2, 3-5 /HPF    Bacteria, UA 1+ (A) None Seen /HPF    Squamous Epithelial Cells, UA 3-5 (A) None Seen, 0-2 /HPF    Hyaline Casts, UA 0-2 None Seen /LPF    Methodology Automated Microscopy    hCG, Serum, Qualitative    Specimen: Blood   Result Value Ref Range    HCG Qualitative Negative Negative   Green Top (Gel)   Result Value Ref Range    Extra Tube Hold for add-ons.    Lavender Top   Result Value Ref Range    Extra Tube hold for add-on    Gold Top - SST   Result Value Ref Range    Extra Tube Hold for add-ons.    Light Blue Top   Result Value Ref Range    Extra Tube Hold for add-ons.      CT Abdomen Pelvis Without Contrast    Result Date: 2/2/2023  INDICATION: right flank pain, known stone EXAMINATION: CT ABDOMEN AND PELVIS WITHOUT CONTRAST - CT ABDOMEN PELVIS WITHOUT IV CONTRAST  TECHNIQUE: Helically acquired images were obtained of the abdomen and pelvis without oral or IV contrast. A radiation dose optimization technique was used for this scan. IV Contrast dosage and agent: None. Oral contrast: None. COMPARISON: CT abdomen pelvis 1/29/2023 ____________________________________________ FINDINGS: LOWER CHEST: Lung bases are clear.  No cardiomegaly or pericardial effusion. LIVER: Homogeneous.  No focal mass. GALLBLADDER AND BILIARY TREE: No calcified gallstones.    No intra- or extrahepatic biliary ductal dilation. PANCREAS: There is no evidence of mass or inflammatory process. SPLEEN: Spleen is normal in size with no focal lesion. ADRENAL GLANDS: Normal in appearance. 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 There is mild to moderate hydronephrosis with dilatation of both the upper and lower pole moieties. These join at the level of the midureter. In the distal ureter there is a 4 mm kidney stone. This has migrated slightly more distally when compared to the prior study. Previously there was a the level of the UVJ. Currently it is at the level of the UVJ.. The left kidney is unremarkable. No intrarenal calculi are identified. URINARY BLADDER: Unremarkable. PERITONEUM: No ascites or free air.  BOWEL: The appendix is normal. There is no bowel distention or evidence of obstruction. There is no evidence of a focal inflammatory process. VASCULAR STRUCTURES AND RETROPERITONEUM: Aorta and IVC are normal in caliber.  There is no evidence of aneurysm. There is no evidence of retroperitoneal lymphadenopathy, mass, or fluid collection REPRODUCTIVE ORGANS: IUD appears to be in satisfactory position in the clinical endometrium. Uterus is normal in size and shape. There is no adnexal mass ABDOMINAL WALL: No discrete abdominal or pelvic wall hernia. BONES: No acute fracture or focal bone lesion.     Persistent mild-to-moderate right hydronephrosis with 4 mm calculus  noted at the level of the UVJ Electronically signed by:  Nguyễn Dalton MD  2/2/2023 7:02 PM CST Workstation: [id number]                                      [facility]    Final diagnoses:   Hydronephrosis, unspecified hydronephrosis type   Ureterolithiasis       ED Disposition  ED Disposition     ED Disposition   Decision to Admit    Condition   --    Comment   Level of Care: Med/Surg [1]   Diagnosis: Hydronephrosis, unspecified hydronephrosis type [[id number]]   Admitting Physician: BEHROOZI, SAEID [[id number]]   Attending Physician: BEHROOZI, SAEID [[id number]]               No follow-up provider specified.       Medication List      No changes were made to your prescriptions during this visit.          Josie Campbell, APRN  02/02/23 1952

## 2023-02-03 ENCOUNTER — APPOINTMENT (OUTPATIENT)
Dept: GENERAL RADIOLOGY | Facility: HOSPITAL | Age: 28
End: 2023-02-03
Payer: COMMERCIAL

## 2023-02-03 ENCOUNTER — ANESTHESIA (OUTPATIENT)
Dept: PERIOP | Facility: HOSPITAL | Age: 28
End: 2023-02-03
Payer: COMMERCIAL

## 2023-02-03 ENCOUNTER — ANESTHESIA EVENT (OUTPATIENT)
Dept: PERIOP | Facility: HOSPITAL | Age: 28
End: 2023-02-03
Payer: COMMERCIAL

## 2023-02-03 PROBLEM — N20.1 URETEROLITHIASIS: Status: ACTIVE | Noted: 2023-02-02

## 2023-02-03 LAB
ANION GAP SERPL CALCULATED.3IONS-SCNC: 9 MMOL/L (ref 5–15)
BACTERIA SPEC AEROBE CULT: NORMAL
BUN SERPL-MCNC: 11 MG/DL (ref 6–20)
BUN/CREAT SERPL: 12.4 (ref 7–25)
CALCIUM SPEC-SCNC: 8.7 MG/DL (ref 8.6–10.5)
CHLORIDE SERPL-SCNC: 109 MMOL/L (ref 98–107)
CO2 SERPL-SCNC: 23 MMOL/L (ref 22–29)
CREAT SERPL-MCNC: 0.89 MG/DL (ref 0.57–1)
DEPRECATED RDW RBC AUTO: 37.8 FL (ref 37–54)
EGFRCR SERPLBLD CKD-EPI 2021: 90.7 ML/MIN/1.73
ERYTHROCYTE [DISTWIDTH] IN BLOOD BY AUTOMATED COUNT: 11.8 % (ref 12.3–15.4)
GLUCOSE SERPL-MCNC: 92 MG/DL (ref 65–99)
HBA1C MFR BLD: 5 % (ref 4.8–5.6)
HCT VFR BLD AUTO: 37 % (ref 34–46.6)
HGB BLD-MCNC: 12.8 G/DL (ref 12–15.9)
MAGNESIUM SERPL-MCNC: 1.7 MG/DL (ref 1.6–2.6)
MCH RBC QN AUTO: 30.6 PG (ref 26.6–33)
MCHC RBC AUTO-ENTMCNC: 34.6 G/DL (ref 31.5–35.7)
MCV RBC AUTO: 88.5 FL (ref 79–97)
PLATELET # BLD AUTO: 202 10*3/MM3 (ref 140–450)
PMV BLD AUTO: 10.3 FL (ref 6–12)
POTASSIUM SERPL-SCNC: 4.3 MMOL/L (ref 3.5–5.2)
RBC # BLD AUTO: 4.18 10*6/MM3 (ref 3.77–5.28)
SODIUM SERPL-SCNC: 141 MMOL/L (ref 136–145)
TSH SERPL DL<=0.05 MIU/L-ACNC: 1.98 UIU/ML (ref 0.27–4.2)
WBC NRBC COR # BLD: 7.79 10*3/MM3 (ref 3.4–10.8)

## 2023-02-03 PROCEDURE — G0378 HOSPITAL OBSERVATION PER HR: HCPCS

## 2023-02-03 PROCEDURE — 25010000002 PROPOFOL 200 MG/20ML EMULSION: Performed by: NURSE ANESTHETIST, CERTIFIED REGISTERED

## 2023-02-03 PROCEDURE — 25010000002 IOPAMIDOL 61 % SOLUTION: Performed by: UROLOGY

## 2023-02-03 PROCEDURE — 25010000002 ONDANSETRON PER 1 MG: Performed by: INTERNAL MEDICINE

## 2023-02-03 PROCEDURE — 80048 BASIC METABOLIC PNL TOTAL CA: CPT | Performed by: INTERNAL MEDICINE

## 2023-02-03 PROCEDURE — 85027 COMPLETE CBC AUTOMATED: CPT | Performed by: INTERNAL MEDICINE

## 2023-02-03 PROCEDURE — 25010000002 LEVOFLOXACIN PER 250 MG: Performed by: UROLOGY

## 2023-02-03 PROCEDURE — 83036 HEMOGLOBIN GLYCOSYLATED A1C: CPT | Performed by: INTERNAL MEDICINE

## 2023-02-03 PROCEDURE — 84443 ASSAY THYROID STIM HORMONE: CPT | Performed by: INTERNAL MEDICINE

## 2023-02-03 PROCEDURE — 25010000002 DEXAMETHASONE PER 1 MG: Performed by: NURSE ANESTHETIST, CERTIFIED REGISTERED

## 2023-02-03 PROCEDURE — 25010000002 MIDAZOLAM PER 1 MG: Performed by: NURSE ANESTHETIST, CERTIFIED REGISTERED

## 2023-02-03 PROCEDURE — C2617 STENT, NON-COR, TEM W/O DEL: HCPCS | Performed by: UROLOGY

## 2023-02-03 PROCEDURE — C1894 INTRO/SHEATH, NON-LASER: HCPCS | Performed by: UROLOGY

## 2023-02-03 PROCEDURE — 25010000002 FENTANYL CITRATE (PF) 50 MCG/ML SOLUTION: Performed by: NURSE ANESTHETIST, CERTIFIED REGISTERED

## 2023-02-03 PROCEDURE — 25010000002 ONDANSETRON PER 1 MG: Performed by: NURSE ANESTHETIST, CERTIFIED REGISTERED

## 2023-02-03 PROCEDURE — 74420 UROGRAPHY RTRGR +-KUB: CPT

## 2023-02-03 PROCEDURE — C1758 CATHETER, URETERAL: HCPCS | Performed by: UROLOGY

## 2023-02-03 PROCEDURE — 25010000002 MORPHINE PER 10 MG: Performed by: UROLOGY

## 2023-02-03 PROCEDURE — 82365 CALCULUS SPECTROSCOPY: CPT | Performed by: UROLOGY

## 2023-02-03 PROCEDURE — C1769 GUIDE WIRE: HCPCS | Performed by: UROLOGY

## 2023-02-03 PROCEDURE — 83735 ASSAY OF MAGNESIUM: CPT | Performed by: INTERNAL MEDICINE

## 2023-02-03 PROCEDURE — 96376 TX/PRO/DX INJ SAME DRUG ADON: CPT

## 2023-02-03 DEVICE — URETERAL STENT
Type: IMPLANTABLE DEVICE | Site: URETER | Status: FUNCTIONAL
Brand: PERCUFLEX™ PLUS

## 2023-02-03 RX ORDER — SODIUM CHLORIDE, SODIUM GLUCONATE, SODIUM ACETATE, POTASSIUM CHLORIDE AND MAGNESIUM CHLORIDE 526; 502; 368; 37; 30 MG/100ML; MG/100ML; MG/100ML; MG/100ML; MG/100ML
50 INJECTION, SOLUTION INTRAVENOUS CONTINUOUS
Status: DISCONTINUED | OUTPATIENT
Start: 2023-02-03 | End: 2023-02-03

## 2023-02-03 RX ORDER — ONDANSETRON 4 MG/1
4 TABLET, FILM COATED ORAL EVERY 6 HOURS PRN
Status: DISCONTINUED | OUTPATIENT
Start: 2023-02-03 | End: 2023-02-04 | Stop reason: HOSPADM

## 2023-02-03 RX ORDER — FENTANYL CITRATE 50 UG/ML
INJECTION, SOLUTION INTRAMUSCULAR; INTRAVENOUS AS NEEDED
Status: DISCONTINUED | OUTPATIENT
Start: 2023-02-03 | End: 2023-02-03 | Stop reason: SURG

## 2023-02-03 RX ORDER — MIDAZOLAM HYDROCHLORIDE 1 MG/ML
INJECTION INTRAMUSCULAR; INTRAVENOUS AS NEEDED
Status: DISCONTINUED | OUTPATIENT
Start: 2023-02-03 | End: 2023-02-03 | Stop reason: SURG

## 2023-02-03 RX ORDER — LEVOFLOXACIN 5 MG/ML
500 INJECTION, SOLUTION INTRAVENOUS ONCE
Status: COMPLETED | OUTPATIENT
Start: 2023-02-03 | End: 2023-02-03

## 2023-02-03 RX ORDER — ONDANSETRON 2 MG/ML
INJECTION INTRAMUSCULAR; INTRAVENOUS AS NEEDED
Status: DISCONTINUED | OUTPATIENT
Start: 2023-02-03 | End: 2023-02-03 | Stop reason: SURG

## 2023-02-03 RX ORDER — PROPOFOL 10 MG/ML
INJECTION, EMULSION INTRAVENOUS AS NEEDED
Status: DISCONTINUED | OUTPATIENT
Start: 2023-02-03 | End: 2023-02-03 | Stop reason: SURG

## 2023-02-03 RX ORDER — ONDANSETRON 2 MG/ML
4 INJECTION INTRAMUSCULAR; INTRAVENOUS ONCE AS NEEDED
Status: COMPLETED | OUTPATIENT
Start: 2023-02-03 | End: 2023-02-03

## 2023-02-03 RX ORDER — NALOXONE HCL 0.4 MG/ML
0.4 VIAL (ML) INJECTION
Status: DISCONTINUED | OUTPATIENT
Start: 2023-02-03 | End: 2023-02-03

## 2023-02-03 RX ORDER — DEXTROSE AND SODIUM CHLORIDE 5; .45 G/100ML; G/100ML
100 INJECTION, SOLUTION INTRAVENOUS CONTINUOUS
Status: DISCONTINUED | OUTPATIENT
Start: 2023-02-03 | End: 2023-02-03

## 2023-02-03 RX ORDER — ONDANSETRON 2 MG/ML
4 INJECTION INTRAMUSCULAR; INTRAVENOUS EVERY 6 HOURS PRN
Status: DISCONTINUED | OUTPATIENT
Start: 2023-02-03 | End: 2023-02-04 | Stop reason: HOSPADM

## 2023-02-03 RX ORDER — LIDOCAINE HYDROCHLORIDE 20 MG/ML
INJECTION, SOLUTION EPIDURAL; INFILTRATION; INTRACAUDAL; PERINEURAL AS NEEDED
Status: DISCONTINUED | OUTPATIENT
Start: 2023-02-03 | End: 2023-02-03 | Stop reason: SURG

## 2023-02-03 RX ORDER — SODIUM CHLORIDE 9 MG/ML
75 INJECTION, SOLUTION INTRAVENOUS CONTINUOUS
Status: DISCONTINUED | OUTPATIENT
Start: 2023-02-03 | End: 2023-02-04 | Stop reason: HOSPADM

## 2023-02-03 RX ORDER — NALOXONE HCL 0.4 MG/ML
0.4 VIAL (ML) INJECTION
Status: DISCONTINUED | OUTPATIENT
Start: 2023-02-03 | End: 2023-02-04 | Stop reason: HOSPADM

## 2023-02-03 RX ORDER — DEXAMETHASONE SODIUM PHOSPHATE 4 MG/ML
INJECTION, SOLUTION INTRA-ARTICULAR; INTRALESIONAL; INTRAMUSCULAR; INTRAVENOUS; SOFT TISSUE AS NEEDED
Status: DISCONTINUED | OUTPATIENT
Start: 2023-02-03 | End: 2023-02-03 | Stop reason: SURG

## 2023-02-03 RX ADMIN — ONDANSETRON 4 MG: 2 INJECTION INTRAMUSCULAR; INTRAVENOUS at 10:21

## 2023-02-03 RX ADMIN — MORPHINE SULFATE 4 MG: 4 INJECTION, SOLUTION INTRAMUSCULAR; INTRAVENOUS at 23:26

## 2023-02-03 RX ADMIN — ONDANSETRON 4 MG: 2 INJECTION INTRAMUSCULAR; INTRAVENOUS at 03:45

## 2023-02-03 RX ADMIN — LIDOCAINE HYDROCHLORIDE 80 MG: 20 INJECTION, SOLUTION EPIDURAL; INFILTRATION; INTRACAUDAL; PERINEURAL at 10:16

## 2023-02-03 RX ADMIN — TAMSULOSIN HYDROCHLORIDE 0.4 MG: 0.4 CAPSULE ORAL at 21:17

## 2023-02-03 RX ADMIN — MIDAZOLAM 2 MG: 1 INJECTION, SOLUTION INTRAMUSCULAR; INTRAVENOUS at 10:11

## 2023-02-03 RX ADMIN — PROPOFOL 200 MG: 10 INJECTION, EMULSION INTRAVENOUS at 10:17

## 2023-02-03 RX ADMIN — LEVOFLOXACIN 500 MG: 5 INJECTION, SOLUTION INTRAVENOUS at 10:17

## 2023-02-03 RX ADMIN — DEXAMETHASONE SODIUM PHOSPHATE 4 MG: 4 INJECTION, SOLUTION INTRAMUSCULAR; INTRAVENOUS at 10:21

## 2023-02-03 RX ADMIN — SERTRALINE 50 MG: 50 TABLET, FILM COATED ORAL at 11:51

## 2023-02-03 RX ADMIN — HYDROCODONE BITARTRATE AND ACETAMINOPHEN 1 TABLET: 7.5; 325 TABLET ORAL at 21:17

## 2023-02-03 RX ADMIN — FENTANYL CITRATE 50 MCG: 50 INJECTION, SOLUTION INTRAMUSCULAR; INTRAVENOUS at 10:15

## 2023-02-03 RX ADMIN — Medication 10 ML: at 21:17

## 2023-02-03 RX ADMIN — Medication 10 ML: at 11:50

## 2023-02-03 RX ADMIN — SODIUM CHLORIDE 25 ML/HR: 9 INJECTION, SOLUTION INTRAVENOUS at 10:13

## 2023-02-03 RX ADMIN — SODIUM CHLORIDE 75 ML/HR: 9 INJECTION, SOLUTION INTRAVENOUS at 21:17

## 2023-02-03 RX ADMIN — MORPHINE SULFATE 4 MG: 4 INJECTION, SOLUTION INTRAMUSCULAR; INTRAVENOUS at 12:04

## 2023-02-03 RX ADMIN — MORPHINE SULFATE 4 MG: 4 INJECTION, SOLUTION INTRAMUSCULAR; INTRAVENOUS at 16:51

## 2023-02-03 RX ADMIN — SODIUM CHLORIDE, SODIUM GLUCONATE, SODIUM ACETATE, POTASSIUM CHLORIDE AND MAGNESIUM CHLORIDE 50 ML/HR: 526; 502; 368; 37; 30 INJECTION, SOLUTION INTRAVENOUS at 09:30

## 2023-02-03 NOTE — ANESTHESIA PREPROCEDURE EVALUATION
Anesthesia Evaluation     Patient summary reviewed and Nursing notes reviewed   no history of anesthetic complications:  NPO Solid Status: > 8 hours  NPO Liquid Status: > 8 hours           Airway   Mallampati: III  TM distance: <3 FB  Neck ROM: full  Possible difficult intubation and Anterior  Dental - normal exam     Pulmonary     breath sounds clear to auscultation  (-) asthma, rhonchi, decreased breath sounds, wheezes, not a smoker  Cardiovascular   Exercise tolerance: good (4-7 METS)    ECG reviewed  Rhythm: regular  Rate: normal    (-) hypertension, past MI, dysrhythmias, angina, murmur, DVT    ROS comment: EKG 2/2/2023:  Normal sinus rhythm  Normal ECG  No previous ECGs available    Neuro/Psych  (+) psychiatric history,    (-) seizures  GI/Hepatic/Renal/Endo    (+) obesity,   renal disease stones,   (-) morbid obesity, GERD    Musculoskeletal (-) negative ROS    Abdominal   (+) obese,    Substance History - negative use     OB/GYN negative ob/gyn ROS   (-)  Pregnant        Other        ROS/Med Hx Other:    IMPRESSION:  Persistent mild-to-moderate right hydronephrosis with 4 mm  calculus noted at the level of the UVJ                Anesthesia Plan    ASA 2     general     (BHcg negative)  intravenous induction     Anesthetic plan, risks, benefits, and alternatives have been provided, discussed and informed consent has been obtained with: patient and father.  Pre-procedure education provided  Plan discussed with CRNA.        CODE STATUS:    Code Status (Patient has no pulse and is not breathing): CPR (Attempt to Resuscitate)  Medical Interventions (Patient has pulse or is breathing): Full Support

## 2023-02-03 NOTE — ED NOTES
"Nursing report ED to floor  Avis Ziegler  27 y.o.  female    HPI:   Chief Complaint   Patient presents with    Flank Pain    Vomiting       Admitting doctor:   Saeid Behroozi, MD    Consulting provider(s):  Consults       Date and Time Order Name Status Description    2/2/2023  7:42 PM Urology (on-call MD unless specified)               Admitting diagnosis:   The primary encounter diagnosis was Hydronephrosis, unspecified hydronephrosis type. A diagnosis of Ureterolithiasis was also pertinent to this visit.    Code status:   Current Code Status       Date Active Code Status Order ID Comments User Context       2/2/2023 2007 CPR (Attempt to Resuscitate) [id number]  Behroozi, Saeid, MD ED        Question Answer    Code Status (Patient has no pulse and is not breathing) CPR (Attempt to Resuscitate)    Medical Interventions (Patient has pulse or is breathing) Full Support                    Allergies:   Amoxicillin, Biaxin [clarithromycin], Ceclor [cefaclor], and Penicillins    Intake and Output  No intake or output data in the 24 hours ending 02/02/23 2009    Weight:       02/02/23  1624   Weight: 82.6 kg (182 lb)       Most recent vitals:   Vitals:    02/02/23 1624 02/02/23 1732 02/02/23 1953   BP: 147/98 120/83 147/97   BP Location: Right arm Right arm Right arm   Patient Position: Sitting Sitting Sitting   Pulse: 91 69 95   Resp: 20  20   Temp: 97.7 °F (36.5 °C)     TempSrc: Oral     SpO2: 98% 100% 99%   Weight: 82.6 kg (182 lb)     Height: 152.4 cm (60\")       Oxygen Therapy: room air    Active LDAs/IV Access:   Lines, Drains & Airways       Active LDAs       Name Placement date Placement time Site Days    Peripheral IV 02/02/23 1658 Right Antecubital 02/02/23 1658  Antecubital  less than 1                    Labs (abnormal labs have a star):   Labs Reviewed   COMPREHENSIVE METABOLIC PANEL - Abnormal; Notable for the following components:       Result Value    Glucose 109 (*)     Potassium 3.4 (*)     All " other components within normal limits    Narrative:     GFR Normal >60  Chronic Kidney Disease <60  Kidney Failure <15     URINALYSIS W/ MICROSCOPIC IF INDICATED (NO CULTURE) - Abnormal; Notable for the following components:    Appearance, UA Cloudy (*)     Blood, UA Large (3+) (*)     Protein, UA Trace (*)     All other components within normal limits   CBC WITH AUTO DIFFERENTIAL - Abnormal; Notable for the following components:    RDW 11.5 (*)     RDW-SD 36.5 (*)     Neutrophil % 78.0 (*)     Lymphocyte % 15.0 (*)     Neutrophils, Absolute 8.38 (*)     All other components within normal limits   URINALYSIS, MICROSCOPIC ONLY - Abnormal; Notable for the following components:    RBC, UA 31-50 (*)     WBC, UA 6-12 (*)     Bacteria, UA 1+ (*)     Squamous Epithelial Cells, UA 3-5 (*)     All other components within normal limits   LIPASE - Normal   HCG, SERUM, QUALITATIVE - Normal   BLOOD CULTURE   URINE CULTURE   RAINBOW DRAW    Narrative:     The following orders were created for panel order [place] Draw.  Procedure                               Abnormality         Status                     ---------                               -----------         ------                     Green Top (Gel)[[id number]]                                  Final result               Lavender Top[[id number]]                                     Final result               Gold Top - SST[[id number]]                                   Final result               Light Blue Top[[id number]]                                   Final result                 Please view results for these tests on the individual orders.   LACTIC ACID, PLASMA   PROCALCITONIN   URINALYSIS W/ CULTURE IF INDICATED   CBC AND DIFFERENTIAL    Narrative:     The following orders were created for panel order CBC & Differential.  Procedure                               Abnormality         Status                     ---------                               -----------         ------                      CBC Auto Differential[[id number]]        Abnormal            Final result                 Please view results for these tests on the individual orders.   GREEN TOP   LAVENDER TOP   GOLD TOP - SST   LIGHT BLUE TOP   EXTRA TUBES    Narrative:     The following orders were created for panel order Extra Tubes.  Procedure                               Abnormality         Status                     ---------                               -----------         ------                     Murray Top[[id number]]                                         In process                   Please view results for these tests on the individual orders.   GRAY TOP       Meds given in ED:   Medications   sodium chloride 0.9 % flush 10 mL (has no administration in time range)   levoFLOXacin (LEVAQUIN) 750 mg/150 mL D5W (premix) (LEVAQUIN) 750 mg (has no administration in time range)   buPROPion XL (WELLBUTRIN XL) 24 hr tablet 150 mg (has no administration in time range)   HYDROcodone-acetaminophen (NORCO) 7.5-325 MG per tablet 1 tablet (has no administration in time range)   sertraline (ZOLOFT) tablet 50 mg (has no administration in time range)   tamsulosin (FLOMAX) 24 hr capsule 0.4 mg (has no administration in time range)   sodium chloride 0.9 % flush 10 mL (has no administration in time range)   sodium chloride 0.9 % flush 10 mL (has no administration in time range)   sodium chloride 0.9 % infusion 40 mL (has no administration in time range)   sodium chloride 0.9 % with KCl 20 mEq/L infusion (has no administration in time range)   sodium chloride 0.9 % bolus 1,000 mL (1,000 mL Intravenous New Bag 2/2/23 1810)   ondansetron (ZOFRAN) injection 4 mg (4 mg Intravenous Given 2/2/23 1811)   ketorolac (TORADOL) injection 30 mg (30 mg Intravenous Given 2/2/23 1811)   potassium chloride (MICRO-K) CR capsule 20 mEq (20 mEq Oral Given 2/2/23 1953)     sodium chloride 0.9 % with KCl 20 mEq, 75 mL/hr         NIH Stroke Scale:        Isolation/Infection(s):  No active isolations   No active infections     COVID Testing  Collected n/a  Resulted n/a    Nursing report ED to floor:  Mental status: a/px4  Ambulatory status: up ad fidencio  Precautions: n/a    ED nurse phone extentsion- [phone]

## 2023-02-03 NOTE — ANESTHESIA PROCEDURE NOTES
Airway  Urgency: elective    Date/Time: 2/3/2023 10:17 AM  End Time:2/3/2023 10:17 AM  Airway not difficult    General Information and Staff    Patient location during procedure: OR  CRNA/CAA: Maribel Wilhelm CRNA  SRNA: Dawson Fong SRNA  Indications and Patient Condition  Indications for airway management: airway protection and CNS depression    Preoxygenated: yes  MILS maintained throughout  Mask difficulty assessment: 0 - not attempted    Final Airway Details  Final airway type: supraglottic airway      Successful airway: I-gel  Size 3     Cormack-Lehane Classification: grade IIa - partial view of glottis  Number of attempts at approach: 1  Assessment: lips, teeth, and gum same as pre-op

## 2023-02-03 NOTE — ANESTHESIA POSTPROCEDURE EVALUATION
Patient: Avis Ziegler    Procedure Summary     Date: 02/03/23 Room / Location: [facility] OR 02 / [facility] OR    Anesthesia Start: 1007 Anesthesia Stop: 1054    Procedure: CYSTOSCOPY, URETEROSCOPY, RETROGRADE PYELOGRAM, HOLMIUM LASER, STENT INSERTION RIGHT (Right) Diagnosis:       Ureterolithiasis      (Ureterolithiasis [N20.1])    Surgeons: Aneesh Kaba MD Provider: Maribel Wilhelm CRNA    Anesthesia Type: general ASA Status: 2          Anesthesia Type: general    Vitals  No vitals data found for the desired time range.          Post Anesthesia Care and Evaluation    Patient location during evaluation: PACU  Patient participation: complete - patient participated  Level of consciousness: awake and alert  Pain score: 0  Pain management: adequate    Airway patency: patent  Anesthetic complications: No anesthetic complications  PONV Status: none  Cardiovascular status: acceptable  Respiratory status: acceptable  Hydration status: acceptable    Comments: 98.0  77 bpm   122/70  12 rpm   95%  No anesthesia care post op

## 2023-02-03 NOTE — OP NOTE
CYSTOSCOPY URETEROSCOPY RETROGRADE PYELOGRAM HOLMIUM LASER STENT INSERTION  Procedure Note    Avis Ziegler  2/3/2023    Pre-op Diagnosis:   Ureterolithiasis [N20.1]    Post-op Diagnosis:     Post-Op Diagnosis Codes:     * Ureterolithiasis [N20.1]    Procedure(s):  CYSTOSCOPY, URETEROSCOPY, RETROGRADE PYELOGRAM, HOLMIUM LASER, STENT INSERTION RIGHT    Surgeon(s):  Aneesh Kaba MD    Anesthesia: Choice    Staff:   Circulator: Everardo Smith RN; Buffy Cook RN  Radiology Technologist: Maricel Núñez  Scrub Person: Lottie Caldwell          Estimated Blood Loss: minimal    Specimens:                ID Type Source Tests Collected by Time   1 (Not marked as sent) : RIGHT URETERAL STONE Calculus Ureter, Right STONE ANALYSIS Aneesh Kaba MD 2/3/2023 1042         Drains: * No LDAs found *    Findings: Distal right ureteral stone    Complications: None    Indications: Same    Description of Procedure: Patient brought surgery placed in dorsolithotomy position sterile prep and drape genitalia fashion I passed a 22 Urdu scope in the bladder right ureter was catheterized 6 cc of contrast placed up the ureter filling defect consistent with stone was noted 0.038 guidewire passed this and dilated with a dilator.  I went up with the rigid ureteroscope with a Byron laser form micron fiber use the dust mode and fragmentation mode fragment the stone and small pieces and flushed out to debris.  Also use a stone basket to flush out the ureter.  Over the top guidewire through-the-scope I placed a 6 x 28 double-J stent stent string was brought to the outside with Tegaderm taping the stent string to the abdomen in routine fashion.  Patient taken recovery having tolerated the procedure well    Aneesh Kaba MD     Date: 2/3/2023  Time: 10:58 CST

## 2023-02-03 NOTE — CONSULTS
[facility]   Consult Note    Patient Name: Avis Ziegler  : 1995  MRN: [id number]  Primary Care Physician:  Chasity Briggs DO  Referring Physician: Francisco Vicente MD  Date of admission: 2023    Consults  Subjective   Subjective     Reason for Consult/ Chief Complaint: Distal right ureteral stone with renal colic    History of Present Illness  Avis Ziegler is a 27 y.o. female cute onset of right flank pain  with associated nausea vomiting possibly low-grade fever.  Came to the emergency room discharged and returned and admitted for stone that had not passed    Review of Systems flank pain    Personal History     Past Medical History:   Diagnosis Date   • Conjunctivitis      BILATERAL     • Encounter for insertion of intrauterine contraceptive device    • Encounter for  visit      visit status    • IUD check up    • Pain in pelvis    • Urinary tract infectious disease    • Vaginal discharge     Fishy vaginal discharge         Past Surgical History:   Procedure Laterality Date   •  SECTION  2014    Primary low transverse  section. Intrauterine pregnancy at 40 5/7 weeks gestation. Induction of labor secondary to postdates. Patient requests  section   • INJECTION OF MEDICATION  2016    Marta (1)  Elizabeth Palacios       Family History: family history includes Anxiety disorder in her mother; Breast cancer in an other family member; Cancer in an other family member; Depression in an other family member; Diabetes in an other family member; Heart disease in her father; Hyperlipidemia in her father; Hypertension in an other family member; Kidney nephrosis in her mother; Sleep disorder in her father. Otherwise pertinent FHx was reviewed and not pertinent to current issue.    Social History:  reports that she has never smoked. She has never used smokeless tobacco. She reports that she does not drink alcohol and does not use  drugs.    Home Medications:   Biotin, HYDROcodone-acetaminophen, Levonorgestrel, buPROPion XL, fluconazole, ondansetron ODT, sertraline, and tamsulosin    Allergies:  Allergies   Allergen Reactions   • Amoxicillin Rash   • Biaxin [Clarithromycin] Rash   • Ceclor [Cefaclor] Rash   • Penicillins Rash       Objective    Objective     Vitals:  Temp:  [97.7 °F (36.5 °C)] 97.7 °F (36.5 °C)  Heart Rate:  [69-95] 95  Resp:  [20] 20  BP: (120-147)/(83-98) 147/97    Physical Exam neck supple lungs clear abdomen benign no CVA tenderness moves all 4 extremities well    Result Review    Result Review:  I have personally reviewed the results from the time of this admission to 2/2/2023 20:11 CST and agree with these findings:  []  Laboratory list / accordion  []  Microbiology  []  Radiology  []  EKG/Telemetry   []  Cardiology/Vascular   []  Pathology  []  Old records  []  Other:  Most notable findings include: Distal right ureteral stone      Assessment & Plan   Assessment / Plan     Brief Patient Summary:  Avis Ziegler is a 27 y.o. female who distal right ureteral stone    Active Hospital Problems:  Active Hospital Problems    Diagnosis    • **Hydronephrosis, unspecified hydronephrosis type      Plan: Strain urine analgesics IV fluids Flomax if unable to pass stone cystoscopy right retrograde ureteroscopy laser lithotripsy risk and benefits of been discussed      Aneesh Kaba MD

## 2023-02-03 NOTE — PROGRESS NOTES
[facility] Medicine   INPATIENT PROGRESS NOTE      Patient Name: Avis Ziegler  Date of Admission: 2/2/2023  Today's Date: 02/03/23  Length of Stay: 0  Primary Care Physician: Chasity Briggs DO    Subjective   HPI:  Patient is a 27 year old female (no pertinent PMHx) who presented to [facility] ED with persistent right flank pain. She had been seen in ED three days before and found to have right ureteral stone, discharged home with Flomax and analgesics. She reported persistent pain along with new burning pain accompanying urination. Antibiotics were initiated and patient admitted to medicine with urology consultation.    02/03/23 :I see patient after she has returned from procedure. She underwent lithotripsy with placement of ureteral stent. She is not having any pain at this time. She does not have any new complaints.     Review of Systems   Constitutional: Negative for appetite change, chills, diaphoresis, fatigue and fever.        No new complaints   HENT: Negative for congestion, ear pain, postnasal drip, rhinorrhea, sinus pressure, sinus pain, sneezing, sore throat and trouble swallowing.    Eyes: Negative for photophobia, pain and discharge.   Respiratory: Negative for cough, chest tightness, shortness of breath and wheezing.    Cardiovascular: Negative for chest pain, palpitations and leg swelling.   Gastrointestinal: Negative for abdominal pain, blood in stool, constipation, diarrhea, nausea and vomiting.   Endocrine: Negative for polydipsia, polyphagia and polyuria.   Genitourinary: Negative for difficulty urinating, dysuria, flank pain, frequency, hematuria and urgency.   Musculoskeletal: Negative for arthralgias, back pain, myalgias and neck pain.   Skin: Negative for color change, rash and wound.   Neurological: Negative for dizziness, seizures, syncope, weakness and headaches.   Hematological: Does not bruise/bleed easily.   Psychiatric/Behavioral:  Negative for behavioral problems, confusion, hallucinations, sleep disturbance and suicidal ideas.        All pertinent negatives and positives are as above. All other systems have been reviewed and are negative unless otherwise stated.     Objective    Temp:  [97.5 °F (36.4 °C)-99.4 °F (37.4 °C)] 98 °F (36.7 °C)  Heart Rate:  [63-97] 73  Resp:  [12-20] 16  BP: ()/(57-98) 136/82  Physical Exam  Vitals and nursing note reviewed.   Constitutional:       Appearance: Normal appearance.   HENT:      Head: Normocephalic and atraumatic.      Right Ear: External ear normal.      Left Ear: External ear normal.      Nose: Nose normal. No congestion or rhinorrhea.      Mouth/Throat:      Mouth: Mucous membranes are moist.      Pharynx: Oropharynx is clear.   Eyes:      General: No scleral icterus.     Extraocular Movements: Extraocular movements intact.      Conjunctiva/sclera: Conjunctivae normal.      Pupils: Pupils are equal, round, and reactive to light.   Neck:      Vascular: No carotid bruit.   Cardiovascular:      Rate and Rhythm: Normal rate and regular rhythm.      Pulses: Normal pulses.      Heart sounds: Normal heart sounds. No murmur heard.  Pulmonary:      Effort: Pulmonary effort is normal.      Breath sounds: Normal breath sounds. No wheezing, rhonchi or rales.   Musculoskeletal:         General: No swelling or deformity. Normal range of motion.      Cervical back: Normal range of motion and neck supple. No tenderness.      Right lower leg: No edema.      Left lower leg: No edema.   Skin:     General: Skin is warm and dry.      Capillary Refill: Capillary refill takes less than 2 seconds.      Findings: No rash.   Neurological:      General: No focal deficit present.      Mental Status: She is alert and oriented to person, place, and time.      Motor: No weakness.   Psychiatric:         Mood and Affect: Mood normal.         Behavior: Behavior normal.         Thought Content: Thought content normal.          Judgment: Judgment normal.       02/03/23 14:08 CST.      buPROPion XL, 150 mg, Oral, QAM  levoFLOXacin, 750 mg, Intravenous, Q24H  sertraline, 50 mg, Oral, Daily  sodium chloride, 10 mL, Intravenous, Q12H  tamsulosin, 0.4 mg, Oral, Nightly         Results Review:  I have reviewed the labs, radiology results, and diagnostic studies.    Laboratory Data:   Results from last 7 days   Lab Units 02/03/23  0535 02/02/23  1657 01/29/23  1759   WBC 10*3/mm3 7.79 10.73 10.06   HEMOGLOBIN g/dL 12.8 12.9 13.1   HEMATOCRIT % 37.0 37.4 37.6   PLATELETS 10*3/mm3 202 222 233        Results from last 7 days   Lab Units 02/03/23  0724 02/02/23  1657 01/29/23  1759   SODIUM mmol/L 141 139 138   POTASSIUM mmol/L 4.3 3.4* 3.6   CHLORIDE mmol/L 109* 104 104   CO2 mmol/L 23.0 27.0 23.0   BUN mg/dL 11 10 9   CREATININE mg/dL 0.89 0.94 0.84   CALCIUM mg/dL 8.7 9.1 9.2   BILIRUBIN mg/dL  --  0.3 0.2   ALK PHOS U/L  --  81 81   ALT (SGPT) U/L  --  15 18   AST (SGOT) U/L  --  18 25   GLUCOSE mg/dL 92 109* 97       Culture Data:   No results found for: BLOODCX  Urine Culture   Date Value Ref Range Status   02/02/2023 25,000 CFU/mL Mixed Lucinda Isolated  Final     No results found for: RESPCX  No results found for: WOUNDCX  No results found for: STOOLCX  No components found for: BODYFLD    Radiology Data:   Imaging Results (Last 24 Hours)     Procedure Component Value Units Date/Time    FL Retrograde Pyelogram In OR [[id number]] Resulted: 02/03/23 1225     Updated: 02/03/23 1225    CT Abdomen Pelvis Without Contrast [[id number]] Collected: 02/02/23 1808     Updated: 02/02/23 1905    Narrative:      INDICATION: right flank pain, known stone    EXAMINATION: CT ABDOMEN AND PELVIS WITHOUT CONTRAST - CT ABDOMEN  PELVIS WITHOUT IV CONTRAST    TECHNIQUE:  Helically acquired images were obtained of the abdomen and pelvis  without oral or IV contrast. A radiation dose optimization  technique was used for this scan.  IV Contrast dosage and agent:  None.  Oral contrast: None.    COMPARISON: CT abdomen pelvis 1/29/2023  ____________________________________________    FINDINGS:    LOWER CHEST: Lung bases are clear.  No cardiomegaly or  pericardial effusion.    LIVER: Homogeneous.  No focal mass.    GALLBLADDER AND BILIARY TREE: No calcified gallstones.    No  intra- or extrahepatic biliary ductal dilation.    PANCREAS: There is no evidence of mass or inflammatory process.    SPLEEN: Spleen is normal in size with no focal lesion.    ADRENAL GLANDS: Normal in appearance.      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 There is mild to moderate hydronephrosis with  dilatation of both the upper and lower pole moieties. These join  at the level of the midureter. In the distal ureter there is a 4  mm kidney stone. This has migrated slightly more distally when  compared to the prior study. Previously there was a the level of  the UVJ. Currently it is at the level of the UVJ.. The left  kidney is unremarkable. No intrarenal calculi are identified.    URINARY BLADDER: Unremarkable.     PERITONEUM: No ascites or free air.      BOWEL: The appendix is normal. There is no bowel distention or  evidence of obstruction. There is no evidence of a focal  inflammatory process.    VASCULAR STRUCTURES AND RETROPERITONEUM: Aorta and IVC are normal  in caliber.  There is no evidence of aneurysm. There is no  evidence of retroperitoneal lymphadenopathy, mass, or fluid  collection    REPRODUCTIVE ORGANS: IUD appears to be in satisfactory position  in the clinical endometrium. Uterus is normal in size and shape.  There is no adnexal mass     ABDOMINAL WALL: No discrete abdominal or pelvic wall hernia.     BONES: No acute fracture or focal bone lesion.       Impression:      Persistent mild-to-moderate right hydronephrosis with 4 mm  calculus noted at the level of the UVJ        Electronically signed by:  Nguyễn Dalton MD  2/2/2023 7:02 PM CST  Workstation:  "[id number]          I have reviewed the patient's current medications.     Assessment/Plan   Treatment Plan:  1. Ureterolithiasis with Hydonephrosis   -right ureteral stone distal ureter with evidence of secondary hydronephrosis   -urology following; lithotripsy w/ stent placement earlier today    2. Abnormal UA   -UA with large blood and minimal WBCs; Urine culture negative for growth, only normal dee   -No indications for IV antibiotics; discontinued     Medical Decision Making  Number and Complexity of problems: 1, moderate complexity    Conditions and Status:        Condition is improving.     [facility] Data  External documents reviewed: The patient's recent past medical charts for this facility as well as outside facilities via the \"Care Everywhere\" application of Epic reviewed.     Discussed with: patient, nursing staff    Care Planning  Shared decision making: Patient updated on current status and informed of proposed care plan; is in agreement with plan  Code status and discussions: Full code    Disposition  Social Determinants of Health that impact treatment or disposition: n/a  I expect the patient to be discharged to 1 in 2 days.         Electronically signed by Avis Shepard PA-C, 02/03/23, 14:08 CST.    "

## 2023-02-03 NOTE — PLAN OF CARE
Goal Outcome Evaluation:              Outcome Evaluation: admission this shift. VSS. Patient has remained NPO since midnight. Possible surgery this AM

## 2023-02-03 NOTE — H&P
[facility] Medicine Admission      Date of Admission: 2023      Primary Care Physician: Chasity Briggs DO      Chief Complaint: Right flank pain    HPI:    Patient is a 27-year-old obese pleasant female with an past medical history of anxiety and depression who initially presented to ER on  with right flank pain.  Patient was diagnosed with ureteric stone and was discharged to home on Flomax and analgesics.  Patient presented today to ER with complaint of persistent pain 5/10 burning with urination per ED record, Dr. Kaba was contacted who accepted the consult with recommendation for observation in the hospital.  Patient was given analgesics and Levaquin in the ED.  Hospitalist service was called for admission of the patient.  I discussed the care with nurse practitioner Josie lim.      Patient was seen and examined in ED room 25.  Her boyfriend is at bedside.  Patient reported initially started having on  right flank pain with associated mild back pain with nausea and nonbilious nonbloody vomiting.  Pain was persistent, sharp, 5-7 over 10, with no significant radiation.  She had some hematuria.  She has been taking analgesics and Flomax at home with recurrence of pain.  In ED she reported some dysuria which she did not report to me.  Her pain responded well to analgesics in the ED.  She is pain-free currently.  She denies any fall injury trauma fever chills headache sore throat, shortness of breath chest pain syncope near syncope palpitation, other abdominal pain, pelvic complaint in particular.  She denies any prior history of kidney stone.    Concurrent Medical History:  has a past medical history of Conjunctivitis, Encounter for insertion of intrauterine contraceptive device, Encounter for  visit, IUD check up, Pain in pelvis, Urinary tract infectious disease, and Vaginal discharge.    Past Surgical History:  has a past surgical  history that includes  section (2014) and Injection of Medication (2016).    Family History: family history includes Anxiety disorder in her mother; Breast cancer in an other family member; Cancer in an other family member; Depression in an other family member; Diabetes in an other family member; Heart disease in her father; Hyperlipidemia in her father; Hypertension in an other family member; Kidney nephrosis in her mother; Sleep disorder in her father.     Social History:  reports that she has never smoked. She has never used smokeless tobacco. She reports that she does not drink alcohol and does not use drugs.    Allergies:   Allergies   Allergen Reactions   • Amoxicillin Rash   • Biaxin [Clarithromycin] Rash   • Ceclor [Cefaclor] Rash   • Penicillins Rash       Medications:   Prior to Admission medications    Medication Sig Start Date End Date Taking? Authorizing Provider   Biotin 1000 MCG chewable tablet Chew 25,000 mcg Daily.    Provider, MD Alok   buPROPion XL (WELLBUTRIN XL) 150 MG 24 hr tablet Take 150 mg by mouth Every Morning. 22   Emergency, Nurse Epic, RN   fluconazole (Diflucan) 150 MG tablet Take 1 tablet by mouth today and another tablet in 3 days 6/15/22   Amita Palmer APRN   HYDROcodone-acetaminophen (NORCO) 7.5-325 MG per tablet Take 1 tablet by mouth Every 6 (Six) Hours As Needed for Moderate Pain. 23   Dawson Sinha MD   Levonorgestrel (MIRENA, 52 MG, IU) by Intrauterine route.    Emergency, Nurse SYD Boyer   ondansetron ODT (ZOFRAN-ODT) 4 MG disintegrating tablet Place 1 tablet on the tongue Every 6 (Six) Hours As Needed for Nausea or Vomiting. 23   Dawson Sinha MD   sertraline (ZOLOFT) 50 MG tablet Take 50 mg by mouth Daily. 22   Emergency, Nurse Rosalind RN   tamsulosin (FLOMAX) 0.4 MG capsule 24 hr capsule Take 1 capsule by mouth Every Night. 23   Dawson Sinha MD       Review of Systems:  Review of  Systems   Constitutional: Negative for chills, diaphoresis, fatigue and fever.   HENT: Negative for congestion, dental problem, ear pain, facial swelling, rhinorrhea and sinus pressure.    Eyes: Negative for photophobia, discharge, redness, itching and visual disturbance.   Respiratory: Negative for apnea, cough, choking, chest tightness, shortness of breath, wheezing and stridor.    Cardiovascular: Negative for chest pain, palpitations and leg swelling.   Gastrointestinal: Positive for nausea and vomiting. Negative for abdominal distention, abdominal pain, anal bleeding, blood in stool, diarrhea and rectal pain.   Endocrine: Negative for cold intolerance, heat intolerance, polydipsia, polyphagia and polyuria.   Genitourinary: Positive for dysuria, flank pain and hematuria. Negative for difficulty urinating, frequency and urgency.   Musculoskeletal: Negative for arthralgias, back pain, joint swelling and myalgias.   Skin: Negative for pallor, rash and wound.   Allergic/Immunologic: Negative for environmental allergies and immunocompromised state.   Neurological: Negative for dizziness, tremors, seizures, facial asymmetry, speech difficulty, weakness, light-headedness, numbness and headaches.   Hematological: Negative for adenopathy. Does not bruise/bleed easily.   Psychiatric/Behavioral: Negative for agitation, behavioral problems and hallucinations. The patient is not nervous/anxious.       Otherwise complete ROS is negative except as mentioned above.    Physical Exam:   Temp:  [97.7 °F (36.5 °C)] 97.7 °F (36.5 °C)  Heart Rate:  [69-95] 95  Resp:  [20] 20  BP: (120-147)/(83-98) 147/97  Physical Exam  Constitutional:       General: She is not in acute distress.     Appearance: She is obese. She is not ill-appearing, toxic-appearing or diaphoretic.   HENT:      Head: Normocephalic and atraumatic.      Right Ear: External ear normal.      Left Ear: External ear normal.      Nose: Nose normal.      Mouth/Throat:       Mouth: Mucous membranes are moist.      Pharynx: Oropharynx is clear.   Eyes:      Extraocular Movements: Extraocular movements intact.      Conjunctiva/sclera: Conjunctivae normal.      Pupils: Pupils are equal, round, and reactive to light.   Cardiovascular:      Rate and Rhythm: Normal rate and regular rhythm.      Heart sounds: No murmur heard.    No friction rub. No gallop.   Pulmonary:      Effort: No respiratory distress.      Breath sounds: No stridor. No wheezing or rales.   Chest:      Chest wall: No tenderness.   Abdominal:      General: Abdomen is flat. There is no distension.      Palpations: Abdomen is soft.      Tenderness: There is no abdominal tenderness. There is no guarding or rebound.   Musculoskeletal:         General: No swelling or tenderness.      Cervical back: No rigidity or tenderness.      Right lower leg: No edema.      Left lower leg: No edema.   Lymphadenopathy:      Cervical: No cervical adenopathy.   Skin:     General: Skin is warm and dry.      Coloration: Skin is not jaundiced.      Findings: No erythema.   Neurological:      Mental Status: She is alert and oriented to person, place, and time. Mental status is at baseline.      Sensory: No sensory deficit.      Motor: No weakness.      Coordination: Coordination normal.   Psychiatric:         Mood and Affect: Mood normal.         Behavior: Behavior normal.         Judgment: Judgment normal.           Results Reviewed:  I have personally reviewed current lab, radiology, and data and agree with results.  Lab Results (last 24 hours)     Procedure Component Value Units Date/Time    hCG, Serum, Qualitative [[id number]]  (Normal) Collected: 02/02/23 1657    Specimen: Blood Updated: 02/02/23 1812     HCG Qualitative Negative    [place] Draw [[id number]] Collected: 02/02/23 1657    Specimen: Blood Updated: 02/02/23 1800    Narrative:      The following orders were created for panel order [place] Draw.  Procedure                                Abnormality         Status                     ---------                               -----------         ------                     Green Top (Gel)[[id number]]                                  Final result               Lavender Top[[id number]]                                     Final result               Gold Top - SST[[id number]]                                   Final result               Light Blue Top[[id number]]                                   Final result                 Please view results for these tests on the individual orders.    Green Top (Gel) [[id number]] Collected: 02/02/23 1657    Specimen: Blood Updated: 02/02/23 1800     Extra Tube Hold for add-ons.     Comment: Auto resulted.       Lavender Top [[id number]] Collected: 02/02/23 1657    Specimen: Blood Updated: 02/02/23 1800     Extra Tube hold for add-on     Comment: Auto resulted       Gold Top - SST [[id number]] Collected: 02/02/23 1657    Specimen: Blood Updated: 02/02/23 1800     Extra Tube Hold for add-ons.     Comment: Auto resulted.       Light Blue Top [[id number]] Collected: 02/02/23 1657    Specimen: Blood Updated: 02/02/23 1800     Extra Tube Hold for add-ons.     Comment: Auto resulted       Comprehensive Metabolic Panel [[id number]]  (Abnormal) Collected: 02/02/23 1657    Specimen: Blood Updated: 02/02/23 1732     Glucose 109 mg/dL      BUN 10 mg/dL      Creatinine 0.94 mg/dL      Sodium 139 mmol/L      Potassium 3.4 mmol/L      Chloride 104 mmol/L      CO2 27.0 mmol/L      Calcium 9.1 mg/dL      Total Protein 7.1 g/dL      Albumin 4.4 g/dL      ALT (SGPT) 15 U/L      AST (SGOT) 18 U/L      Alkaline Phosphatase 81 U/L      Total Bilirubin 0.3 mg/dL      Globulin 2.7 gm/dL      A/G Ratio 1.6 g/dL      BUN/Creatinine Ratio 10.6     Anion Gap 8.0 mmol/L      eGFR 84.9 mL/min/1.73     Narrative:      GFR Normal >60  Chronic Kidney Disease <60  Kidney Failure <15      Lipase [[id number]]  (Normal) Collected: 02/02/23 1657    Specimen: Blood  Updated: 02/02/23 1732     Lipase 13 U/L     CBC & Differential [[id number]]  (Abnormal) Collected: 02/02/23 1657    Specimen: Blood Updated: 02/02/23 1714    Narrative:      The following orders were created for panel order CBC & Differential.  Procedure                               Abnormality         Status                     ---------                               -----------         ------                     CBC Auto Differential[[id number]]        Abnormal            Final result                 Please view results for these tests on the individual orders.    CBC Auto Differential [[id number]]  (Abnormal) Collected: 02/02/23 1657    Specimen: Blood Updated: 02/02/23 1714     WBC 10.73 10*3/mm3      RBC 4.32 10*6/mm3      Hemoglobin 12.9 g/dL      Hematocrit 37.4 %      MCV 86.6 fL      MCH 29.9 pg      MCHC 34.5 g/dL      RDW 11.5 %      RDW-SD 36.5 fl      MPV 9.1 fL      Platelets 222 10*3/mm3      Neutrophil % 78.0 %      Lymphocyte % 15.0 %      Monocyte % 5.7 %      Eosinophil % 0.7 %      Basophil % 0.3 %      Immature Grans % 0.3 %      Neutrophils, Absolute 8.38 10*3/mm3      Lymphocytes, Absolute 1.61 10*3/mm3      Monocytes, Absolute 0.61 10*3/mm3      Eosinophils, Absolute 0.07 10*3/mm3      Basophils, Absolute 0.03 10*3/mm3      Immature Grans, Absolute 0.03 10*3/mm3      nRBC 0.0 /100 WBC     Extra Tubes [[id number]] Collected: 02/02/23 1703    Specimen: Blood, Venous Line Updated: 02/02/23 1704    Narrative:      The following orders were created for panel order Extra Tubes.  Procedure                               Abnormality         Status                     ---------                               -----------         ------                     Murray Top[[id number]]                                         In process                   Please view results for these tests on the individual orders.    Murray Top [[id number]] Collected: 02/02/23 1703    Specimen: Blood Updated: 02/02/23 1704     Urinalysis, Microscopic Only - Urine, Clean Catch [[id number]]  (Abnormal) Collected: 02/02/23 1651    Specimen: Urine, Clean Catch Updated: 02/02/23 1700     RBC, UA 31-50 /HPF      WBC, UA 6-12 /HPF      Bacteria, UA 1+ /HPF      Squamous Epithelial Cells, UA 3-5 /HPF      Hyaline Casts, UA 0-2 /LPF      Methodology Automated Microscopy    Urinalysis With Microscopic If Indicated (No Culture) - Urine, Clean Catch [[id number]]  (Abnormal) Collected: 02/02/23 1651    Specimen: Urine, Clean Catch Updated: 02/02/23 1658     Color, UA Yellow     Appearance, UA Cloudy     pH, UA <=5.0     Specific Gravity, UA 1.029     Glucose, UA Negative     Ketones, UA Negative     Bilirubin, UA Negative     Blood, UA Large (3+)     Protein, UA Trace     Leuk Esterase, UA Negative     Nitrite, UA Negative     Urobilinogen, UA 0.2 E.U./dL        Imaging Results (Last 24 Hours)     Procedure Component Value Units Date/Time    CT Abdomen Pelvis Without Contrast [[id number]] Collected: 02/02/23 1808     Updated: 02/02/23 1905    Narrative:      INDICATION: right flank pain, known stone    EXAMINATION: CT ABDOMEN AND PELVIS WITHOUT CONTRAST - CT ABDOMEN  PELVIS WITHOUT IV CONTRAST    TECHNIQUE:  Helically acquired images were obtained of the abdomen and pelvis  without oral or IV contrast. A radiation dose optimization  technique was used for this scan.  IV Contrast dosage and agent: None.  Oral contrast: None.    COMPARISON: CT abdomen pelvis 1/29/2023  ____________________________________________    FINDINGS:    LOWER CHEST: Lung bases are clear.  No cardiomegaly or  pericardial effusion.    LIVER: Homogeneous.  No focal mass.    GALLBLADDER AND BILIARY TREE: No calcified gallstones.    No  intra- or extrahepatic biliary ductal dilation.    PANCREAS: There is no evidence of mass or inflammatory process.    SPLEEN: Spleen is normal in size with no focal lesion.    ADRENAL GLANDS: Normal in appearance.      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 There is mild to moderate hydronephrosis with  dilatation of both the upper and lower pole moieties. These join  at the level of the midureter. In the distal ureter there is a 4  mm kidney stone. This has migrated slightly more distally when  compared to the prior study. Previously there was a the level of  the UVJ. Currently it is at the level of the UVJ.. The left  kidney is unremarkable. No intrarenal calculi are identified.    URINARY BLADDER: Unremarkable.     PERITONEUM: No ascites or free air.      BOWEL: The appendix is normal. There is no bowel distention or  evidence of obstruction. There is no evidence of a focal  inflammatory process.    VASCULAR STRUCTURES AND RETROPERITONEUM: Aorta and IVC are normal  in caliber.  There is no evidence of aneurysm. There is no  evidence of retroperitoneal lymphadenopathy, mass, or fluid  collection    REPRODUCTIVE ORGANS: IUD appears to be in satisfactory position  in the clinical endometrium. Uterus is normal in size and shape.  There is no adnexal mass     ABDOMINAL WALL: No discrete abdominal or pelvic wall hernia.     BONES: No acute fracture or focal bone lesion.       Impression:      Persistent mild-to-moderate right hydronephrosis with 4 mm  calculus noted at the level of the UVJ        Electronically signed by:  Nguyễn Dalton MD  2/2/2023 7:02 PM CST  Workstation: [id number]            Assessment:    Active Hospital Problems    Diagnosis    • **Hydronephrosis, unspecified hydronephrosis type        # Right flank pain secondary to UVJ stone  # Right hydronephrosis secondary to obstructing UVJ stone  # Hematuria secondary to above  # Mild hypokalemia   # Obesity with BMI of 35      Treatment plan:     Maintain on observation  N.p.o. past midnight for potential urology intervention in a.m.  Obtain further laboratory work including TSH and hemoglobin A1c level  Obtain urine culture for reassurance considering abnormal UA  since last presentation and stone, to avoid missing developing complicated urinary tract infection  Place on IV fluid trolled low rate, antiemetic analgesics.  Patient received Levaquin in ED.  Maintained on antibiotic pending urology evaluation, patient with history of allergy to penicillins and Ceclor in form of rash  Maintain on Flomax.  Urology service Dr. Kaba was informed in ED.  Await for input.  Strain all urine  Correct electrolyte abnormalities, repeat potassium and obtain magnesium level in a.m.  Obtain EKG for evaluation of QTC, considering being on multiple medication with tendency for prolongation of QTC.  Comorbidities, chronic medical problems will be treated appropriately  DVT and GI prophylaxis will initiated.  Avoid heparin and Lovenox considering possible need for urology intervention.  Reconcile home medication continue with essential home medications.  Please see orders for comprehensive plan.      Medical Decision Making  Number and Complexity of problems: Multiple acute medical problems as above.  Differential Diagnosis: Entertained and considered    Conditions and Status:        Condition is worsening.     [facility] Data  External documents reviewed: Recent ED record  My EKG interpretation: Obtain EKG to evaluate for QTC considering being multiple medication with tendency for prolongation of QTC  My CT interpretation: Hydronephrosis with UVJ stone  Tests considered but not ordered:      Decision rules/scores evaluated (example [id number], [place], etc):      Discussed with: Patient and ED attending   I have utilized all available immediate resources to obtain, update, or review the patient's current medications (including all prescriptions, over-the-counter products, herbals, cannabis/cannabidiol products, and vitamin/mineral/dietary (nutritional) supplements).          Care Planning  Shared decision making: Patient and ED  Code status and discussions: Full code    Disposition  Social  Determinants of Health that impact treatment or disposition:   I expect the patient to be discharged to home in 1-2 days.          I confirmed that the patient's Advance Care Plan is present, code status is documented, or surrogate decision maker is listed in the patient's medical record.     I have utilized all available immediate resources to obtain, update, or review the patient's current medications.     I discussed the patient's findings and my recommendations with: Patient and she agreed with above plan of care      Saeid Behroozi, MD   02/02/23   19:58 CST

## 2023-02-04 VITALS
RESPIRATION RATE: 18 BRPM | HEART RATE: 78 BPM | WEIGHT: 179.06 LBS | SYSTOLIC BLOOD PRESSURE: 121 MMHG | OXYGEN SATURATION: 98 % | HEIGHT: 60 IN | BODY MASS INDEX: 35.15 KG/M2 | DIASTOLIC BLOOD PRESSURE: 64 MMHG | TEMPERATURE: 98.7 F

## 2023-02-04 LAB
ANION GAP SERPL CALCULATED.3IONS-SCNC: 11 MMOL/L (ref 5–15)
BASOPHILS # BLD AUTO: 0.03 10*3/MM3 (ref 0–0.2)
BASOPHILS NFR BLD AUTO: 0.3 % (ref 0–1.5)
BUN SERPL-MCNC: 9 MG/DL (ref 6–20)
BUN/CREAT SERPL: 11.4 (ref 7–25)
CALCIUM SPEC-SCNC: 8.8 MG/DL (ref 8.6–10.5)
CHLORIDE SERPL-SCNC: 108 MMOL/L (ref 98–107)
CO2 SERPL-SCNC: 21 MMOL/L (ref 22–29)
CREAT SERPL-MCNC: 0.79 MG/DL (ref 0.57–1)
DEPRECATED RDW RBC AUTO: 37.3 FL (ref 37–54)
EGFRCR SERPLBLD CKD-EPI 2021: 104.6 ML/MIN/1.73
EOSINOPHIL # BLD AUTO: 0.07 10*3/MM3 (ref 0–0.4)
EOSINOPHIL NFR BLD AUTO: 0.7 % (ref 0.3–6.2)
ERYTHROCYTE [DISTWIDTH] IN BLOOD BY AUTOMATED COUNT: 11.8 % (ref 12.3–15.4)
GLUCOSE SERPL-MCNC: 110 MG/DL (ref 65–99)
HCT VFR BLD AUTO: 35.2 % (ref 34–46.6)
HGB BLD-MCNC: 12.4 G/DL (ref 12–15.9)
IMM GRANULOCYTES # BLD AUTO: 0.04 10*3/MM3 (ref 0–0.05)
IMM GRANULOCYTES NFR BLD AUTO: 0.4 % (ref 0–0.5)
LYMPHOCYTES # BLD AUTO: 2.64 10*3/MM3 (ref 0.7–3.1)
LYMPHOCYTES NFR BLD AUTO: 26.1 % (ref 19.6–45.3)
MCH RBC QN AUTO: 30.6 PG (ref 26.6–33)
MCHC RBC AUTO-ENTMCNC: 35.2 G/DL (ref 31.5–35.7)
MCV RBC AUTO: 86.9 FL (ref 79–97)
MONOCYTES # BLD AUTO: 0.78 10*3/MM3 (ref 0.1–0.9)
MONOCYTES NFR BLD AUTO: 7.7 % (ref 5–12)
NEUTROPHILS NFR BLD AUTO: 6.56 10*3/MM3 (ref 1.7–7)
NEUTROPHILS NFR BLD AUTO: 64.8 % (ref 42.7–76)
NRBC BLD AUTO-RTO: 0 /100 WBC (ref 0–0.2)
PLATELET # BLD AUTO: 219 10*3/MM3 (ref 140–450)
PMV BLD AUTO: 9.3 FL (ref 6–12)
POTASSIUM SERPL-SCNC: 3.8 MMOL/L (ref 3.5–5.2)
RBC # BLD AUTO: 4.05 10*6/MM3 (ref 3.77–5.28)
SODIUM SERPL-SCNC: 140 MMOL/L (ref 136–145)
WBC NRBC COR # BLD: 10.12 10*3/MM3 (ref 3.4–10.8)

## 2023-02-04 PROCEDURE — 85025 COMPLETE CBC W/AUTO DIFF WBC: CPT | Performed by: UROLOGY

## 2023-02-04 PROCEDURE — 80048 BASIC METABOLIC PNL TOTAL CA: CPT | Performed by: UROLOGY

## 2023-02-04 PROCEDURE — G0378 HOSPITAL OBSERVATION PER HR: HCPCS

## 2023-02-04 RX ORDER — HYDROCODONE BITARTRATE AND ACETAMINOPHEN 7.5; 325 MG/1; MG/1
1 TABLET ORAL EVERY 6 HOURS PRN
Qty: 12 TABLET | Refills: 0 | Status: SHIPPED | OUTPATIENT
Start: 2023-02-04 | End: 2023-02-07

## 2023-02-04 RX ADMIN — BUPROPION HYDROCHLORIDE 150 MG: 150 TABLET, FILM COATED, EXTENDED RELEASE ORAL at 06:11

## 2023-02-04 RX ADMIN — HYDROCODONE BITARTRATE AND ACETAMINOPHEN 1 TABLET: 7.5; 325 TABLET ORAL at 10:38

## 2023-02-04 RX ADMIN — SERTRALINE 50 MG: 50 TABLET, FILM COATED ORAL at 09:11

## 2023-02-04 NOTE — PLAN OF CARE
Goal Outcome Evaluation:  Plan of Care Reviewed With: patient        Progress: improving  Outcome Evaluation: Patient resting in bed at this time with significant other at bedside.  Patient said her pain is under control with the morphine post stent insertion.  No s/s of distress noted.  Will continue to monitor.

## 2023-02-04 NOTE — DISCHARGE SUMMARY
[facility] Medicine Services  DISCHARGE SUMMARY       Date of Admission: 2/2/2023  Date of Discharge:  2/4/2023  Primary Care Physician: Chasity Briggs DO    Presenting Problem/History of Present Illness:  Ureterolithiasis [N20.1]  Hydronephrosis, unspecified hydronephrosis type [N13.30]       Final Discharge Diagnoses:  Active Hospital Problems    Diagnosis    • **Hydronephrosis, unspecified hydronephrosis type    • Ureterolithiasis        Consults:   Consults     Date and Time Order Name Status Description    2/2/2023  7:42 PM Urology (on-call MD unless specified)            Procedures Performed: Procedure(s):  CYSTOSCOPY, URETEROSCOPY, RETROGRADE PYELOGRAM, HOLMIUM LASER, STENT INSERTION RIGHT                Pertinent Test Results:   Lab Results (most recent)     Procedure Component Value Units Date/Time    Basic Metabolic Panel [[id number]]  (Abnormal) Collected: 02/04/23 0707    Specimen: Blood Updated: 02/04/23 0756     Glucose 110 mg/dL      BUN 9 mg/dL      Creatinine 0.79 mg/dL      Sodium 140 mmol/L      Potassium 3.8 mmol/L      Comment: Slight hemolysis detected by analyzer. Results may be affected.        Chloride 108 mmol/L      CO2 21.0 mmol/L      Calcium 8.8 mg/dL      BUN/Creatinine Ratio 11.4     Anion Gap 11.0 mmol/L      eGFR 104.6 mL/min/1.73     Narrative:      GFR Normal >60  Chronic Kidney Disease <60  Kidney Failure <15      CBC & Differential [[id number]]  (Abnormal) Collected: 02/04/23 0707    Specimen: Blood Updated: 02/04/23 0740    Narrative:      The following orders were created for panel order CBC & Differential.  Procedure                               Abnormality         Status                     ---------                               -----------         ------                     CBC Auto Differential[[id number]]        Abnormal            Final result                 Please view results for these tests on the individual orders.     CBC Auto Differential [[id number]]  (Abnormal) Collected: 02/04/23 0707    Specimen: Blood Updated: 02/04/23 0740     WBC 10.12 10*3/mm3      RBC 4.05 10*6/mm3      Hemoglobin 12.4 g/dL      Hematocrit 35.2 %      MCV 86.9 fL      MCH 30.6 pg      MCHC 35.2 g/dL      RDW 11.8 %      RDW-SD 37.3 fl      MPV 9.3 fL      Platelets 219 10*3/mm3      Neutrophil % 64.8 %      Lymphocyte % 26.1 %      Monocyte % 7.7 %      Eosinophil % 0.7 %      Basophil % 0.3 %      Immature Grans % 0.4 %      Neutrophils, Absolute 6.56 10*3/mm3      Lymphocytes, Absolute 2.64 10*3/mm3      Monocytes, Absolute 0.78 10*3/mm3      Eosinophils, Absolute 0.07 10*3/mm3      Basophils, Absolute 0.03 10*3/mm3      Immature Grans, Absolute 0.04 10*3/mm3      nRBC 0.0 /100 WBC     Blood Culture - Blood, Hand, Left [[id number]]  (Normal) Collected: 02/02/23 2146    Specimen: Blood from Hand, Left Updated: 02/03/23 2215     Blood Culture No growth at 24 hours    Urine Culture - Urine, Urine, Clean Catch [[id number]] Collected: 02/02/23 1651    Specimen: Urine, Clean Catch Updated: 02/03/23 1321     Urine Culture 25,000 CFU/mL Mixed Lucinda Isolated    Narrative:      Specimen contains mixed organisms of questionable pathogenicity suggestive of contamination. If symptoms persist, suggest recollection.  Colonization of the urinary tract without infection is common. Treatment is discouraged unless the patient is symptomatic, pregnant, or undergoing an invasive urologic procedure.    STONE ANALYSIS - Calculus, Ureter, Right [[id number]] Collected: 02/03/23 1042    Specimen: Calculus from Ureter, Right Updated: 02/03/23 1102    Basic Metabolic Panel [[id number]]  (Abnormal) Collected: 02/03/23 0724    Specimen: Blood Updated: 02/03/23 0816     Glucose 92 mg/dL      BUN 11 mg/dL      Creatinine 0.89 mg/dL      Sodium 141 mmol/L      Potassium 4.3 mmol/L      Chloride 109 mmol/L      CO2 23.0 mmol/L      Calcium 8.7 mg/dL      BUN/Creatinine Ratio 12.4      "Anion Gap 9.0 mmol/L      eGFR 90.7 mL/min/1.73     Narrative:      GFR Normal >60  Chronic Kidney Disease <60  Kidney Failure <15      Magnesium [[id number]]  (Normal) Collected: 02/03/23 0724    Specimen: Blood Updated: 02/03/23 0815     Magnesium 1.7 mg/dL     TSH [[id number]]  (Normal) Collected: 02/03/23 0535    Specimen: Blood Updated: 02/03/23 0645     TSH 1.980 uIU/mL     CBC (No Diff) [[id number]]  (Abnormal) Collected: 02/03/23 0535    Specimen: Blood Updated: 02/03/23 0626     WBC 7.79 10*3/mm3      RBC 4.18 10*6/mm3      Hemoglobin 12.8 g/dL      Hematocrit 37.0 %      MCV 88.5 fL      MCH 30.6 pg      MCHC 34.6 g/dL      RDW 11.8 %      RDW-SD 37.8 fl      MPV 10.3 fL      Platelets 202 10*3/mm3     Hemoglobin A1c [[id number]]  (Normal) Collected: 02/03/23 0535    Specimen: Blood Updated: 02/03/23 0621     Hemoglobin A1C 5.00 %     Narrative:      Hemoglobin A1C Ranges:    Increased Risk for Diabetes  5.7% to 6.4%  Diabetes                     >= 6.5%  Diabetic Goal                < 7.0%    Procalcitonin [[id number]]  (Normal) Collected: 02/02/23 1657    Specimen: Blood Updated: 02/02/23 2247     Procalcitonin 0.04 ng/mL     Narrative:      As a Marker for Sepsis (Non-Neonates):    1. <0.5 ng/mL represents a low risk of severe sepsis and/or septic shock.  2. >2 ng/mL represents a high risk of severe sepsis and/or septic shock.    As a Marker for Lower Respiratory Tract Infections that require antibiotic therapy:    PCT on Admission    Antibiotic Therapy       6-12 Hrs later    >0.5                Strongly Recommended  >0.25 - <0.5        Recommended   0.1 - 0.25          Discouraged              Remeasure/reassess PCT  <0.1                Strongly Discouraged     Remeasure/reassess PCT    As 28 day mortality risk marker: \"Change in Procalcitonin Result\" (>80% or <=80%) if Day 0 (or Day 1) and Day 4 values are available. Refer to http://[email or web address]-pct-calculator.com    Change in PCT <=80%  A decrease of PCT " levels below or equal to 80% defines a positive change in PCT test result representing a higher risk for 28-day all-cause mortality of patients diagnosed with severe sepsis for septic shock.    Change in PCT >80%  A decrease of PCT levels of more than 80% defines a negative change in PCT result representing a lower risk for 28-day all-cause mortality of patients diagnosed with severe sepsis or septic shock.       Extra Tubes [[id number]] Collected: 02/02/23 1703    Specimen: Blood Updated: 02/02/23 2115    Narrative:      The following orders were created for panel order Extra Tubes.  Procedure                               Abnormality         Status                     ---------                               -----------         ------                     Murray Top[[id number]]                                         Final result                 Please view results for these tests on the individual orders.    Murray Top [[id number]] Collected: 02/02/23 1703    Specimen: Blood Updated: 02/02/23 2115     Extra Tube Hold for add-ons.     Comment: Auto resulted.       Lactic Acid, Plasma [[id number]]  (Normal) Collected: 02/02/23 1703    Specimen: Blood Updated: 02/02/23 2032     Lactate 1.0 mmol/L     hCG, Serum, Qualitative [[id number]]  (Normal) Collected: 02/02/23 1657    Specimen: Blood Updated: 02/02/23 1812     HCG Qualitative Negative    [place] Draw [[id number]] Collected: 02/02/23 1657    Specimen: Blood Updated: 02/02/23 1800    Narrative:      The following orders were created for panel order [place] Draw.  Procedure                               Abnormality         Status                     ---------                               -----------         ------                     Green Top (Gel)[[id number]]                                  Final result               Lavender Top[[id number]]                                     Final result               Gold Top - SST[[id number]]                                   Final  result               Light Blue Top[[id number]]                                   Final result                 Please view results for these tests on the individual orders.    Green Top (Gel) [[id number]] Collected: 02/02/23 1657    Specimen: Blood Updated: 02/02/23 1800     Extra Tube Hold for add-ons.     Comment: Auto resulted.       Lavender Top [[id number]] Collected: 02/02/23 1657    Specimen: Blood Updated: 02/02/23 1800     Extra Tube hold for add-on     Comment: Auto resulted       Gold Top - SST [[id number]] Collected: 02/02/23 1657    Specimen: Blood Updated: 02/02/23 1800     Extra Tube Hold for add-ons.     Comment: Auto resulted.       Light Blue Top [[id number]] Collected: 02/02/23 1657    Specimen: Blood Updated: 02/02/23 1800     Extra Tube Hold for add-ons.     Comment: Auto resulted       Comprehensive Metabolic Panel [[id number]]  (Abnormal) Collected: 02/02/23 1657    Specimen: Blood Updated: 02/02/23 1732     Glucose 109 mg/dL      BUN 10 mg/dL      Creatinine 0.94 mg/dL      Sodium 139 mmol/L      Potassium 3.4 mmol/L      Chloride 104 mmol/L      CO2 27.0 mmol/L      Calcium 9.1 mg/dL      Total Protein 7.1 g/dL      Albumin 4.4 g/dL      ALT (SGPT) 15 U/L      AST (SGOT) 18 U/L      Alkaline Phosphatase 81 U/L      Total Bilirubin 0.3 mg/dL      Globulin 2.7 gm/dL      A/G Ratio 1.6 g/dL      BUN/Creatinine Ratio 10.6     Anion Gap 8.0 mmol/L      eGFR 84.9 mL/min/1.73     Narrative:      GFR Normal >60  Chronic Kidney Disease <60  Kidney Failure <15      Lipase [[id number]]  (Normal) Collected: 02/02/23 1657    Specimen: Blood Updated: 02/02/23 1732     Lipase 13 U/L     CBC & Differential [[id number]]  (Abnormal) Collected: 02/02/23 1657    Specimen: Blood Updated: 02/02/23 1714    Narrative:      The following orders were created for panel order CBC & Differential.  Procedure                               Abnormality         Status                     ---------                                -----------         ------                     CBC Auto Differential[[id number]]        Abnormal            Final result                 Please view results for these tests on the individual orders.    CBC Auto Differential [[id number]]  (Abnormal) Collected: 02/02/23 1657    Specimen: Blood Updated: 02/02/23 1714     WBC 10.73 10*3/mm3      RBC 4.32 10*6/mm3      Hemoglobin 12.9 g/dL      Hematocrit 37.4 %      MCV 86.6 fL      MCH 29.9 pg      MCHC 34.5 g/dL      RDW 11.5 %      RDW-SD 36.5 fl      MPV 9.1 fL      Platelets 222 10*3/mm3      Neutrophil % 78.0 %      Lymphocyte % 15.0 %      Monocyte % 5.7 %      Eosinophil % 0.7 %      Basophil % 0.3 %      Immature Grans % 0.3 %      Neutrophils, Absolute 8.38 10*3/mm3      Lymphocytes, Absolute 1.61 10*3/mm3      Monocytes, Absolute 0.61 10*3/mm3      Eosinophils, Absolute 0.07 10*3/mm3      Basophils, Absolute 0.03 10*3/mm3      Immature Grans, Absolute 0.03 10*3/mm3      nRBC 0.0 /100 WBC     Urinalysis, Microscopic Only - Urine, Clean Catch [[id number]]  (Abnormal) Collected: 02/02/23 1651    Specimen: Urine, Clean Catch Updated: 02/02/23 1700     RBC, UA 31-50 /HPF      WBC, UA 6-12 /HPF      Bacteria, UA 1+ /HPF      Squamous Epithelial Cells, UA 3-5 /HPF      Hyaline Casts, UA 0-2 /LPF      Methodology Automated Microscopy    Urinalysis With Microscopic If Indicated (No Culture) - Urine, Clean Catch [[id number]]  (Abnormal) Collected: 02/02/23 1651    Specimen: Urine, Clean Catch Updated: 02/02/23 1658     Color, UA Yellow     Appearance, UA Cloudy     pH, UA <=5.0     Specific Gravity, UA 1.029     Glucose, UA Negative     Ketones, UA Negative     Bilirubin, UA Negative     Blood, UA Large (3+)     Protein, UA Trace     Leuk Esterase, UA Negative     Nitrite, UA Negative     Urobilinogen, UA 0.2 E.U./dL        Imaging Results (Most Recent)     Procedure Component Value Units Date/Time    FL Retrograde Pyelogram In OR [[id number]] Resulted: 02/03/23 1225      Updated: 02/03/23 1225    CT Abdomen Pelvis Without Contrast [[id number]] Collected: 02/02/23 1808     Updated: 02/02/23 1905    Narrative:      INDICATION: right flank pain, known stone    EXAMINATION: CT ABDOMEN AND PELVIS WITHOUT CONTRAST - CT ABDOMEN  PELVIS WITHOUT IV CONTRAST    TECHNIQUE:  Helically acquired images were obtained of the abdomen and pelvis  without oral or IV contrast. A radiation dose optimization  technique was used for this scan.  IV Contrast dosage and agent: None.  Oral contrast: None.    COMPARISON: CT abdomen pelvis 1/29/2023  ____________________________________________    FINDINGS:    LOWER CHEST: Lung bases are clear.  No cardiomegaly or  pericardial effusion.    LIVER: Homogeneous.  No focal mass.    GALLBLADDER AND BILIARY TREE: No calcified gallstones.    No  intra- or extrahepatic biliary ductal dilation.    PANCREAS: There is no evidence of mass or inflammatory process.    SPLEEN: Spleen is normal in size with no focal lesion.    ADRENAL GLANDS: Normal in appearance.      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 There is mild to moderate hydronephrosis with  dilatation of both the upper and lower pole moieties. These join  at the level of the midureter. In the distal ureter there is a 4  mm kidney stone. This has migrated slightly more distally when  compared to the prior study. Previously there was a the level of  the UVJ. Currently it is at the level of the UVJ.. The left  kidney is unremarkable. No intrarenal calculi are identified.    URINARY BLADDER: Unremarkable.     PERITONEUM: No ascites or free air.      BOWEL: The appendix is normal. There is no bowel distention or  evidence of obstruction. There is no evidence of a focal  inflammatory process.    VASCULAR STRUCTURES AND RETROPERITONEUM: Aorta and IVC are normal  in caliber.  There is no evidence of aneurysm. There is no  evidence of retroperitoneal lymphadenopathy, mass, or  "fluid  collection    REPRODUCTIVE ORGANS: IUD appears to be in satisfactory position  in the clinical endometrium. Uterus is normal in size and shape.  There is no adnexal mass     ABDOMINAL WALL: No discrete abdominal or pelvic wall hernia.     BONES: No acute fracture or focal bone lesion.       Impression:      Persistent mild-to-moderate right hydronephrosis with 4 mm  calculus noted at the level of the UVJ        Electronically signed by:  Nguyễn Dlaton MD  2/2/2023 7:02 PM CST  Workstation: [id number]          Chief Complaint on Day of Discharge: none    Hospital Course:  The patient is a 27 y.o. female who presented to [facility] with persistent right flank pain. She had been seen in ED three days before and found to have right ureteral stone, discharged home with Flomax and analgesics. She reported persistent pain along with new burning pain accompanying urination. CT imaging revealed persistent right hydronephrosis with the 4mm calculus at the level of the UVJ. Antibiotics were initiated and patient admitted to medicine with urology consultation.      Urology took patient for right cystoscopy retrograde ureteroscopy with laser lithotripsy and ureteral stent placement on 02/03/23. Procedure without any incident. Patient recovered well and was able to eat and drink without issue. No issues with urination. Overnight, continued to do well and had little issue with pain control. Urine culture only grew normal dee; antibiotics discontinued. Discharged home with plan to follow up with urology early next week (within 5 days) for possible stent removal.     Condition on Discharge:  Good    Physical Exam on Discharge:  /64 (BP Location: Left arm, Patient Position: Lying)   Pulse 78   Temp 98.7 °F (37.1 °C)   Resp 18   Ht 152.4 cm (60\")   Wt 81.2 kg (179 lb 1 oz)   LMP 01/26/2023 (Approximate)   SpO2 98%   BMI 34.97 kg/m²   Physical Exam  Vitals and nursing note reviewed.   Constitutional: "       Appearance: Normal appearance.   HENT:      Head: Normocephalic and atraumatic.      Right Ear: External ear normal.      Left Ear: External ear normal.      Nose: Nose normal. No congestion or rhinorrhea.      Mouth/Throat:      Mouth: Mucous membranes are moist.      Pharynx: Oropharynx is clear.   Eyes:      General: No scleral icterus.     Extraocular Movements: Extraocular movements intact.      Conjunctiva/sclera: Conjunctivae normal.      Pupils: Pupils are equal, round, and reactive to light.   Neck:      Vascular: No carotid bruit.   Cardiovascular:      Rate and Rhythm: Normal rate and regular rhythm.      Pulses: Normal pulses.      Heart sounds: Normal heart sounds. No murmur heard.  Pulmonary:      Effort: Pulmonary effort is normal.      Breath sounds: Normal breath sounds. No wheezing, rhonchi or rales.   Abdominal:      General: Abdomen is flat. Bowel sounds are normal.      Palpations: Abdomen is soft.      Tenderness: There is no abdominal tenderness. There is no guarding.   Musculoskeletal:         General: No swelling or deformity. Normal range of motion.      Cervical back: Normal range of motion and neck supple. No tenderness.      Right lower leg: No edema.      Left lower leg: No edema.   Skin:     General: Skin is warm and dry.      Capillary Refill: Capillary refill takes less than 2 seconds.      Findings: No rash.   Neurological:      General: No focal deficit present.      Mental Status: She is alert and oriented to person, place, and time.      Motor: No weakness.   Psychiatric:         Mood and Affect: Mood normal.         Behavior: Behavior normal.         Thought Content: Thought content normal.         Judgment: Judgment normal.           Discharge Disposition:  Home or Self Care    Discharge Medications:     Discharge Medications      Continue These Medications      Instructions Start Date   Biotin 1000 MCG chewable tablet   25,000 mcg, Oral, Daily      buPROPion  MG 24  hr tablet  Commonly known as: WELLBUTRIN XL   150 mg, Oral, Every Morning      fluconazole 150 MG tablet  Commonly known as: Diflucan   Take 1 tablet by mouth today and another tablet in 3 days      HYDROcodone-acetaminophen 7.5-325 MG per tablet  Commonly known as: NORCO   1 tablet, Oral, Every 6 Hours PRN      MIRENA (52 MG) IU   Intrauterine      ondansetron ODT 4 MG disintegrating tablet  Commonly known as: ZOFRAN-ODT   4 mg, Translingual, Every 6 Hours PRN      sertraline 50 MG tablet  Commonly known as: ZOLOFT   50 mg, Oral, Daily      tamsulosin 0.4 MG capsule 24 hr capsule  Commonly known as: FLOMAX   0.4 mg, Oral, Nightly             Discharge Diet:   Diet Instructions     Diet: Regular      Discharge Diet: Regular          Activity at Discharge:   Activity Instructions     Activity as Tolerated            Discharge Care Plan/Instructions: Follow up with urology next week    Follow-up Appointments:   No future appointments.    Test Results Pending at Discharge:   Pending Labs     Order Current Status    STONE ANALYSIS - Calculus, Ureter, Right In process    Blood Culture - Blood, Hand, Left Preliminary result          I have utilized all available immediate resources to obtain, update, or review the patient's current medications (including all prescriptions, over-the-counter products, herbals, cannabis/cannabidiol products, and vitamin/mineral/dietary (nutritional) supplements).       Electronically signed by Avis Shepard PA-C, 02/04/23, 13:54 CST.    Time Spent in Discharge Management for this Patient: 30 Minutes

## 2023-02-04 NOTE — PROGRESS NOTES
LOS: 0 days     Patient Care Team:  Chasity Briggs DO as PCP - General (Family Medicine)      Subjective     Post ureteral stone extraction laser lithotripsy no acute distress    Objective       Vital Signs  Temp:  [98 °F (36.7 °C)-99.1 °F (37.3 °C)] 99.1 °F (37.3 °C)  Heart Rate:  [63-84] 75  Resp:  [16-20] 20  BP: (105-136)/(59-82) 105/59    Physical Exam:        General Appearance:   No distress     Respiratory:    UNLABORED RESPIRATIONS.     Abdomen:     SOFT.       Genitourinary:  Urine clear stent string taped to the abdomen     Rectal:     DEFERRED       Results Review:       Imaging Results (Last 24 Hours)     Procedure Component Value Units Date/Time    FL Retrograde Pyelogram In OR [[id number]] Resulted: 02/03/23 1225     Updated: 02/03/23 1225        Lab Results (last 24 hours)     Procedure Component Value Units Date/Time    Basic Metabolic Panel [[id number]]  (Abnormal) Collected: 02/04/23 0707    Specimen: Blood Updated: 02/04/23 0756     Glucose 110 mg/dL      BUN 9 mg/dL      Creatinine 0.79 mg/dL      Sodium 140 mmol/L      Potassium 3.8 mmol/L      Comment: Slight hemolysis detected by analyzer. Results may be affected.        Chloride 108 mmol/L      CO2 21.0 mmol/L      Calcium 8.8 mg/dL      BUN/Creatinine Ratio 11.4     Anion Gap 11.0 mmol/L      eGFR 104.6 mL/min/1.73     Narrative:      GFR Normal >60  Chronic Kidney Disease <60  Kidney Failure <15      CBC & Differential [[id number]]  (Abnormal) Collected: 02/04/23 0707    Specimen: Blood Updated: 02/04/23 0740    Narrative:      The following orders were created for panel order CBC & Differential.  Procedure                               Abnormality         Status                     ---------                               -----------         ------                     CBC Auto Differential[[id number]]        Abnormal            Final result                 Please view results for these tests on the individual orders.    CBC Auto Differential  [[id number]]  (Abnormal) Collected: 02/04/23 0707    Specimen: Blood Updated: 02/04/23 0740     WBC 10.12 10*3/mm3      RBC 4.05 10*6/mm3      Hemoglobin 12.4 g/dL      Hematocrit 35.2 %      MCV 86.9 fL      MCH 30.6 pg      MCHC 35.2 g/dL      RDW 11.8 %      RDW-SD 37.3 fl      MPV 9.3 fL      Platelets 219 10*3/mm3      Neutrophil % 64.8 %      Lymphocyte % 26.1 %      Monocyte % 7.7 %      Eosinophil % 0.7 %      Basophil % 0.3 %      Immature Grans % 0.4 %      Neutrophils, Absolute 6.56 10*3/mm3      Lymphocytes, Absolute 2.64 10*3/mm3      Monocytes, Absolute 0.78 10*3/mm3      Eosinophils, Absolute 0.07 10*3/mm3      Basophils, Absolute 0.03 10*3/mm3      Immature Grans, Absolute 0.04 10*3/mm3      nRBC 0.0 /100 WBC     Blood Culture - Blood, Hand, Left [[id number]]  (Normal) Collected: 02/02/23 2146    Specimen: Blood from Hand, Left Updated: 02/03/23 2215     Blood Culture No growth at 24 hours    Urine Culture - Urine, Urine, Clean Catch [[id number]] Collected: 02/02/23 1651    Specimen: Urine, Clean Catch Updated: 02/03/23 1321     Urine Culture 25,000 CFU/mL Mixed Lucinda Isolated    Narrative:      Specimen contains mixed organisms of questionable pathogenicity suggestive of contamination. If symptoms persist, suggest recollection.  Colonization of the urinary tract without infection is common. Treatment is discouraged unless the patient is symptomatic, pregnant, or undergoing an invasive urologic procedure.            I reviewed the patient's new clinical results.  I reviewed the patient's new imaging results and agree with the interpretation.  I reviewed the patient's other test results and agree with the interpretation        Assessment:    Post ureteral stone extraction laser lithotripsy    Plan:     K for discharge with stent string taped to abdomen see in the office Tuesday or Wednesday      Aneesh Kaba MD  02/04/23  11:43 CST

## 2023-02-05 LAB
QT INTERVAL: 400 MS
QTC INTERVAL: 432 MS

## 2023-02-07 ENCOUNTER — PREP FOR SURGERY (OUTPATIENT)
Dept: OTHER | Facility: HOSPITAL | Age: 28
End: 2023-02-07
Payer: COMMERCIAL

## 2023-02-07 LAB — BACTERIA SPEC AEROBE CULT: NORMAL

## 2023-02-08 LAB
CALCIUM OXALATE DIHYDRATE MFR STONE IR: 40 %
COLOR STONE: NORMAL
COM MFR STONE: 60 %
COMPN STONE: NORMAL
LABORATORY COMMENT REPORT: NORMAL
Lab: NORMAL
Lab: NORMAL
PHOTO: NORMAL
SIZE STONE: NORMAL MM
SPEC SOURCE SUBJ: NORMAL
WT STONE: 20 MG

## (undated) DEVICE — CONTAINER,SPECIMEN,OR STERILE,4OZ: Brand: MEDLINE

## (undated) DEVICE — URETERAL ACCESS SHEATH SET: Brand: NAVIGATOR HD

## (undated) DEVICE — FIBR LASR MOSES 365 DFL 6J 80HZ 120W

## (undated) DEVICE — NITINOL WIRE WITH HYDROPHILIC TIP: Brand: SENSOR

## (undated) DEVICE — ADAPT CATH URETRL

## (undated) DEVICE — CATH URETRL OPEN END W/CONNECT 5F 70CM

## (undated) DEVICE — PK CYSTO LF 60

## (undated) DEVICE — DRSNG SURESITE WNDW 2.38X2.75

## (undated) DEVICE — SOL PVPI SPRY BETADINE 3OZ

## (undated) DEVICE — SOL IRR H2O BTL 1000ML STRL

## (undated) DEVICE — STERILE POLYISOPRENE POWDER-FREE SURGICAL GLOVES: Brand: PROTEXIS

## (undated) DEVICE — NITINOL STONE RETRIEVAL BASKET: Brand: ZERO TIP

## (undated) DEVICE — SOL IRR NACL 0.9PCT 3000ML